# Patient Record
Sex: MALE | Race: WHITE
[De-identification: names, ages, dates, MRNs, and addresses within clinical notes are randomized per-mention and may not be internally consistent; named-entity substitution may affect disease eponyms.]

---

## 2017-02-07 PROBLEM — Z00.00 ENCOUNTER FOR PREVENTIVE HEALTH EXAMINATION: Status: ACTIVE | Noted: 2017-02-07

## 2017-02-13 ENCOUNTER — APPOINTMENT (OUTPATIENT)
Dept: HEMATOLOGY ONCOLOGY | Facility: CLINIC | Age: 62
End: 2017-02-13

## 2017-02-13 ENCOUNTER — OUTPATIENT (OUTPATIENT)
Dept: OUTPATIENT SERVICES | Facility: HOSPITAL | Age: 62
LOS: 1 days | Discharge: HOME | End: 2017-02-13

## 2017-02-13 ENCOUNTER — RESULT REVIEW (OUTPATIENT)
Age: 62
End: 2017-02-13

## 2017-02-13 VITALS
DIASTOLIC BLOOD PRESSURE: 96 MMHG | RESPIRATION RATE: 14 BRPM | SYSTOLIC BLOOD PRESSURE: 143 MMHG | BODY MASS INDEX: 31.84 KG/M2 | HEIGHT: 69 IN | WEIGHT: 215 LBS | HEART RATE: 75 BPM | TEMPERATURE: 98.6 F

## 2017-02-13 DIAGNOSIS — D69.6 THROMBOCYTOPENIA, UNSPECIFIED: ICD-10-CM

## 2017-02-13 DIAGNOSIS — Z86.79 PERSONAL HISTORY OF OTHER DISEASES OF THE CIRCULATORY SYSTEM: ICD-10-CM

## 2017-02-13 DIAGNOSIS — Z78.9 OTHER SPECIFIED HEALTH STATUS: ICD-10-CM

## 2017-02-13 LAB
BASOPHILS # BLD: 0.07 TH/MM3
BASOPHILS NFR BLD: 1 %
EOSINOPHIL # BLD: 0.18 TH/MM3
EOSINOPHIL NFR BLD: 2.7 %
ERYTHROCYTE [DISTWIDTH] IN BLOOD BY AUTOMATED COUNT: 13.9 %
GRANULOCYTES # BLD: 4.36 TH/MM3
GRANULOCYTES NFR BLD: 64.4 %
HCT VFR BLD AUTO: 45.3 %
HGB BLD-MCNC: 15.4 G/DL
IMM GRANULOCYTES # BLD: 0.01 TH/MM3
IMM GRANULOCYTES NFR BLD: 0.1 %
LYMPHOCYTES # BLD: 1.54 TH/MM3
LYMPHOCYTES NFR BLD: 22.7 %
MCH RBC QN AUTO: 27.9 PG
MCHC RBC AUTO-ENTMCNC: 34 G/DL
MCV RBC AUTO: 82.1 FL
MONOCYTES # BLD: 0.62 TH/MM3
MONOCYTES NFR BLD: 9.1 %
PLATELET # BLD: 136 TH/MM3
PMV BLD AUTO: 11.5 FL
RBC # BLD AUTO: 5.52 MIL/MM3
WBC # BLD: 6.78 TH/MM3

## 2017-02-14 PROBLEM — D69.6 THROMBOCYTOPENIA: Status: ACTIVE | Noted: 2017-02-13

## 2017-02-14 LAB
ALBUMIN SERPL-MCNC: 4.1 G/DL
ALBUMIN/GLOB SERPL: 1.86
ALP SERPL-CCNC: 60 IU/L
ALT SERPL-CCNC: 17 IU/L
ANION GAP SERPL CALC-SCNC: 9 MEQ/L
AST SERPL-CCNC: 18 IU/L
BILIRUB SERPL-MCNC: 1 MG/DL
BUN SERPL-MCNC: 24 MG/DL
BUN/CREAT SERPL: 16.2 %
CALCIUM SERPL-MCNC: 9.2 MG/DL
CHLORIDE SERPL-SCNC: 103 MEQ/L
CO2 SERPL-SCNC: 24 MEQ/L
CREAT SERPL-MCNC: 1.48 MG/DL
GFR SERPL CREATININE-BSD FRML MDRD: 49
GLUCOSE SERPL-MCNC: 60 MG/DL
PERI HIV-1 ANTIBODY SCREEN (NORTH): NONREACTIVE
POTASSIUM SERPL-SCNC: 3.9 MMOL/L
PROT SERPL-MCNC: 6.3 G/DL
SODIUM SERPL-SCNC: 136 MEQ/L

## 2017-02-15 LAB
LDH SERPL-CCNC: 165 U/L
LDH1 CFR SERPL ELPH: 24 %
LDH1/LDH2 SERPL-CRTO: 0.69
LDH2 CFR SERPL ELPH: 34 %
LDH3 CFR SERPL ELPH: 23 %
LDH4 CFR SERPL ELPH: 9 %
LDH5 CFR SERPL ELPH: 10 %

## 2017-02-16 LAB
ANA PAT FLD IF-IMP: ABNORMAL
ANA TITR SER: ABNORMAL
FOLATE SERPL-MCNC: 14.4 NG/ML
HBV CORE AB SER-ACNC: NONREACTIVE
HBV SURFACE AB SER-ACNC: NONREACTIVE
HBV SURFACE AG SER-ACNC: NONREACTIVE
HCV AB S/CO SERPL IA: 0.11 S/CO
HCV AB SER QL: NONREACTIVE
PS IGG SER-ACNC: <10 U/ML
PS IGM SER-ACNC: <25 U/ML
RHEUMATOID FACT SERPL-ACNC: 9 IU/ML
VIT B12 SERPL-MCNC: 585 PG/ML

## 2017-02-23 ENCOUNTER — RESULT REVIEW (OUTPATIENT)
Age: 62
End: 2017-02-23

## 2017-02-23 LAB
B2 GLYCOPROT1 IGA SER-ACNC: < 9 SAU
B2 GLYCOPROT1 IGG SER-ACNC: < 9 SGU
B2 GLYCOPROT1 IGM SER-ACNC: < 9 SMU
CARDIOLIPIN IGA SER IA-ACNC: < 11 APL

## 2017-03-09 ENCOUNTER — APPOINTMENT (OUTPATIENT)
Dept: HEMATOLOGY ONCOLOGY | Facility: CLINIC | Age: 62
End: 2017-03-09

## 2017-06-14 ENCOUNTER — EMERGENCY (EMERGENCY)
Facility: HOSPITAL | Age: 62
LOS: 0 days | Discharge: HOME | End: 2017-06-14

## 2017-06-28 DIAGNOSIS — M54.5 LOW BACK PAIN: ICD-10-CM

## 2017-06-28 DIAGNOSIS — M54.42 LUMBAGO WITH SCIATICA, LEFT SIDE: ICD-10-CM

## 2017-06-28 DIAGNOSIS — I10 ESSENTIAL (PRIMARY) HYPERTENSION: ICD-10-CM

## 2017-06-28 DIAGNOSIS — Z79.899 OTHER LONG TERM (CURRENT) DRUG THERAPY: ICD-10-CM

## 2017-11-03 ENCOUNTER — OUTPATIENT (OUTPATIENT)
Dept: OUTPATIENT SERVICES | Facility: HOSPITAL | Age: 62
LOS: 1 days | Discharge: HOME | End: 2017-11-03

## 2017-11-06 DIAGNOSIS — R89.7 ABNORMAL HISTOLOGICAL FINDINGS IN SPECIMENS FROM OTHER ORGANS, SYSTEMS AND TISSUES: ICD-10-CM

## 2018-01-17 ENCOUNTER — OUTPATIENT (OUTPATIENT)
Dept: OUTPATIENT SERVICES | Facility: HOSPITAL | Age: 63
LOS: 1 days | Discharge: HOME | End: 2018-01-17

## 2018-01-17 DIAGNOSIS — I10 ESSENTIAL (PRIMARY) HYPERTENSION: ICD-10-CM

## 2018-01-17 DIAGNOSIS — Z12.5 ENCOUNTER FOR SCREENING FOR MALIGNANT NEOPLASM OF PROSTATE: ICD-10-CM

## 2018-01-26 ENCOUNTER — OUTPATIENT (OUTPATIENT)
Dept: OUTPATIENT SERVICES | Facility: HOSPITAL | Age: 63
LOS: 1 days | Discharge: HOME | End: 2018-01-26

## 2018-01-26 DIAGNOSIS — E78.00 PURE HYPERCHOLESTEROLEMIA, UNSPECIFIED: ICD-10-CM

## 2018-07-06 ENCOUNTER — OUTPATIENT (OUTPATIENT)
Dept: OUTPATIENT SERVICES | Facility: HOSPITAL | Age: 63
LOS: 1 days | Discharge: HOME | End: 2018-07-06

## 2018-07-06 DIAGNOSIS — N40.0 BENIGN PROSTATIC HYPERPLASIA WITHOUT LOWER URINARY TRACT SYMPTOMS: ICD-10-CM

## 2018-07-27 PROBLEM — Z78.9 ALCOHOL USE: Status: ACTIVE | Noted: 2017-02-13

## 2018-12-07 ENCOUNTER — OUTPATIENT (OUTPATIENT)
Dept: OUTPATIENT SERVICES | Facility: HOSPITAL | Age: 63
LOS: 1 days | Discharge: HOME | End: 2018-12-07

## 2018-12-07 DIAGNOSIS — E78.5 HYPERLIPIDEMIA, UNSPECIFIED: ICD-10-CM

## 2018-12-07 DIAGNOSIS — I10 ESSENTIAL (PRIMARY) HYPERTENSION: ICD-10-CM

## 2018-12-27 ENCOUNTER — OUTPATIENT (OUTPATIENT)
Dept: OUTPATIENT SERVICES | Facility: HOSPITAL | Age: 63
LOS: 1 days | Discharge: HOME | End: 2018-12-27

## 2018-12-27 DIAGNOSIS — R35.1 NOCTURIA: ICD-10-CM

## 2018-12-27 DIAGNOSIS — N40.1 BENIGN PROSTATIC HYPERPLASIA WITH LOWER URINARY TRACT SYMPTOMS: ICD-10-CM

## 2019-05-03 ENCOUNTER — OUTPATIENT (OUTPATIENT)
Dept: OUTPATIENT SERVICES | Facility: HOSPITAL | Age: 64
LOS: 1 days | Discharge: HOME | End: 2019-05-03

## 2019-05-03 DIAGNOSIS — N40.0 BENIGN PROSTATIC HYPERPLASIA WITHOUT LOWER URINARY TRACT SYMPTOMS: ICD-10-CM

## 2019-07-01 ENCOUNTER — OUTPATIENT (OUTPATIENT)
Dept: OUTPATIENT SERVICES | Facility: HOSPITAL | Age: 64
LOS: 1 days | End: 2019-07-01
Payer: MEDICAID

## 2019-07-01 PROCEDURE — G9005: CPT

## 2019-07-23 ENCOUNTER — EMERGENCY (EMERGENCY)
Facility: HOSPITAL | Age: 64
LOS: 0 days | Discharge: HOME | End: 2019-07-23
Attending: EMERGENCY MEDICINE | Admitting: EMERGENCY MEDICINE
Payer: MEDICAID

## 2019-07-23 VITALS
TEMPERATURE: 99 F | RESPIRATION RATE: 18 BRPM | WEIGHT: 216.05 LBS | OXYGEN SATURATION: 100 % | HEART RATE: 79 BPM | HEIGHT: 69 IN | SYSTOLIC BLOOD PRESSURE: 154 MMHG | DIASTOLIC BLOOD PRESSURE: 89 MMHG

## 2019-07-23 VITALS
OXYGEN SATURATION: 97 % | HEART RATE: 62 BPM | RESPIRATION RATE: 18 BRPM | DIASTOLIC BLOOD PRESSURE: 79 MMHG | SYSTOLIC BLOOD PRESSURE: 136 MMHG | TEMPERATURE: 97 F

## 2019-07-23 DIAGNOSIS — K55.069 ACUTE INFARCTION OF INTESTINE, PART AND EXTENT UNSPECIFIED: ICD-10-CM

## 2019-07-23 DIAGNOSIS — R10.9 UNSPECIFIED ABDOMINAL PAIN: ICD-10-CM

## 2019-07-23 LAB
ALBUMIN SERPL ELPH-MCNC: 4.4 G/DL — SIGNIFICANT CHANGE UP (ref 3.5–5.2)
ALP SERPL-CCNC: 81 U/L — SIGNIFICANT CHANGE UP (ref 30–115)
ALT FLD-CCNC: 14 U/L — SIGNIFICANT CHANGE UP (ref 0–41)
ANION GAP SERPL CALC-SCNC: 10 MMOL/L — SIGNIFICANT CHANGE UP (ref 7–14)
APPEARANCE UR: CLEAR — SIGNIFICANT CHANGE UP
AST SERPL-CCNC: 14 U/L — SIGNIFICANT CHANGE UP (ref 0–41)
BASOPHILS # BLD AUTO: 0.08 K/UL — SIGNIFICANT CHANGE UP (ref 0–0.2)
BASOPHILS NFR BLD AUTO: 1.1 % — HIGH (ref 0–1)
BILIRUB SERPL-MCNC: 0.5 MG/DL — SIGNIFICANT CHANGE UP (ref 0.2–1.2)
BILIRUB UR-MCNC: NEGATIVE — SIGNIFICANT CHANGE UP
BUN SERPL-MCNC: 20 MG/DL — SIGNIFICANT CHANGE UP (ref 10–20)
CALCIUM SERPL-MCNC: 9 MG/DL — SIGNIFICANT CHANGE UP (ref 8.5–10.1)
CHLORIDE SERPL-SCNC: 104 MMOL/L — SIGNIFICANT CHANGE UP (ref 98–110)
CO2 SERPL-SCNC: 27 MMOL/L — SIGNIFICANT CHANGE UP (ref 17–32)
COLOR SPEC: YELLOW — SIGNIFICANT CHANGE UP
CREAT SERPL-MCNC: 1.3 MG/DL — SIGNIFICANT CHANGE UP (ref 0.7–1.5)
DIFF PNL FLD: NEGATIVE — SIGNIFICANT CHANGE UP
EOSINOPHIL # BLD AUTO: 0.12 K/UL — SIGNIFICANT CHANGE UP (ref 0–0.7)
EOSINOPHIL NFR BLD AUTO: 1.6 % — SIGNIFICANT CHANGE UP (ref 0–8)
GLUCOSE SERPL-MCNC: 97 MG/DL — SIGNIFICANT CHANGE UP (ref 70–99)
GLUCOSE UR QL: NEGATIVE MG/DL — SIGNIFICANT CHANGE UP
HCT VFR BLD CALC: 47 % — SIGNIFICANT CHANGE UP (ref 42–52)
HGB BLD-MCNC: 15.6 G/DL — SIGNIFICANT CHANGE UP (ref 14–18)
IMM GRANULOCYTES NFR BLD AUTO: 0.5 % — HIGH (ref 0.1–0.3)
KETONES UR-MCNC: NEGATIVE — SIGNIFICANT CHANGE UP
LACTATE SERPL-SCNC: 0.8 MMOL/L — SIGNIFICANT CHANGE UP (ref 0.5–2.2)
LEUKOCYTE ESTERASE UR-ACNC: NEGATIVE — SIGNIFICANT CHANGE UP
LIDOCAIN IGE QN: 34 U/L — SIGNIFICANT CHANGE UP (ref 7–60)
LYMPHOCYTES # BLD AUTO: 1.39 K/UL — SIGNIFICANT CHANGE UP (ref 1.2–3.4)
LYMPHOCYTES # BLD AUTO: 18.7 % — LOW (ref 20.5–51.1)
MCHC RBC-ENTMCNC: 28 PG — SIGNIFICANT CHANGE UP (ref 27–31)
MCHC RBC-ENTMCNC: 33.2 G/DL — SIGNIFICANT CHANGE UP (ref 32–37)
MCV RBC AUTO: 84.4 FL — SIGNIFICANT CHANGE UP (ref 80–94)
MONOCYTES # BLD AUTO: 0.69 K/UL — HIGH (ref 0.1–0.6)
MONOCYTES NFR BLD AUTO: 9.3 % — SIGNIFICANT CHANGE UP (ref 1.7–9.3)
NEUTROPHILS # BLD AUTO: 5.12 K/UL — SIGNIFICANT CHANGE UP (ref 1.4–6.5)
NEUTROPHILS NFR BLD AUTO: 68.8 % — SIGNIFICANT CHANGE UP (ref 42.2–75.2)
NITRITE UR-MCNC: NEGATIVE — SIGNIFICANT CHANGE UP
NRBC # BLD: 0 /100 WBCS — SIGNIFICANT CHANGE UP (ref 0–0)
PH UR: 6 — SIGNIFICANT CHANGE UP (ref 5–8)
PLATELET # BLD AUTO: 110 K/UL — LOW (ref 130–400)
POTASSIUM SERPL-MCNC: 4.1 MMOL/L — SIGNIFICANT CHANGE UP (ref 3.5–5)
POTASSIUM SERPL-SCNC: 4.1 MMOL/L — SIGNIFICANT CHANGE UP (ref 3.5–5)
PROT SERPL-MCNC: 6.6 G/DL — SIGNIFICANT CHANGE UP (ref 6–8)
PROT UR-MCNC: 30 MG/DL
RBC # BLD: 5.57 M/UL — SIGNIFICANT CHANGE UP (ref 4.7–6.1)
RBC # FLD: 14 % — SIGNIFICANT CHANGE UP (ref 11.5–14.5)
RBC CASTS # UR COMP ASSIST: ABNORMAL /HPF
SODIUM SERPL-SCNC: 141 MMOL/L — SIGNIFICANT CHANGE UP (ref 135–146)
SP GR SPEC: 1.02 — SIGNIFICANT CHANGE UP (ref 1.01–1.03)
UROBILINOGEN FLD QL: 0.2 MG/DL — SIGNIFICANT CHANGE UP (ref 0.2–0.2)
WBC # BLD: 7.44 K/UL — SIGNIFICANT CHANGE UP (ref 4.8–10.8)
WBC # FLD AUTO: 7.44 K/UL — SIGNIFICANT CHANGE UP (ref 4.8–10.8)
WBC UR QL: ABNORMAL /HPF

## 2019-07-23 PROCEDURE — 99284 EMERGENCY DEPT VISIT MOD MDM: CPT

## 2019-07-23 PROCEDURE — 74018 RADEX ABDOMEN 1 VIEW: CPT | Mod: 26

## 2019-07-23 PROCEDURE — 74177 CT ABD & PELVIS W/CONTRAST: CPT | Mod: 26

## 2019-07-23 PROCEDURE — 76705 ECHO EXAM OF ABDOMEN: CPT | Mod: 26

## 2019-07-23 RX ORDER — SODIUM CHLORIDE 9 MG/ML
1000 INJECTION, SOLUTION INTRAVENOUS ONCE
Refills: 0 | Status: DISCONTINUED | OUTPATIENT
Start: 2019-07-23 | End: 2019-07-23

## 2019-07-23 RX ORDER — KETOROLAC TROMETHAMINE 30 MG/ML
15 SYRINGE (ML) INJECTION ONCE
Refills: 0 | Status: DISCONTINUED | OUTPATIENT
Start: 2019-07-23 | End: 2019-07-23

## 2019-07-23 RX ORDER — IOHEXOL 300 MG/ML
30 INJECTION, SOLUTION INTRAVENOUS ONCE
Refills: 0 | Status: COMPLETED | OUTPATIENT
Start: 2019-07-23 | End: 2019-07-23

## 2019-07-23 RX ADMIN — IOHEXOL 30 MILLILITER(S): 300 INJECTION, SOLUTION INTRAVENOUS at 11:40

## 2019-07-23 RX ADMIN — Medication 15 MILLIGRAM(S): at 14:30

## 2019-07-23 NOTE — ED PROVIDER NOTE - PHYSICAL EXAMINATION
CONSTITUTIONAL: Well-developed; well-nourished; in no acute distress.   SKIN: warm, dry  HEAD: Normocephalic; atraumatic.  EYES: no conjunctival injection.   ENT: No nasal discharge; airway clear.  CARD: Regular rate and rhythm.   RESP: CTA  ABD: Soft, non distended. Tender to palpation in RUQ and RLQ. No skin lesions/changes overlying abdomen. [place] negative. [place] negative. Obturator negative.   EXT: Normal ROM.  No clubbing, cyanosis or edema.   LYMPH: No acute cervical adenopathy.  NEURO: Alert, oriented, grossly unremarkable  PSYCH: Cooperative, appropriate.

## 2019-07-23 NOTE — ED PROVIDER NOTE - CARE PROVIDERS DIRECT ADDRESSES
[email or web address]

## 2019-07-23 NOTE — ED PROVIDER NOTE - CARE PROVIDER_API CALL
Abbi Low)  Surgery  [address], 3rd [place] [address]  Phone: [phone]  Fax: [phone]  Follow Up Time:     Fransico Garcias)  Urology  [address], [address]  [place] [address]  Phone: [phone]  Fax: [phone]  Follow Up Time:

## 2019-07-23 NOTE — CONSULT NOTE ADULT - ASSESSMENT
Impression     A 64 male with RLQ pain x 2 days and umbilical hernia x few years. wbc unremarkable    Plan    -awaiting CT scan image   -case to be discussed with the attending Impression     A 64 male with RLQ pain x 2 days and umbilical hernia x few years. wbc 7.44 , afebrile , lact 0.8 , minimal tenderness in RLQ.   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  Complex left upper pole cystic renal mass with thickened septations and probable solid component, is suspicious for a malignant cystic neoplasm, a Bosniak IV lesion.  Incompletely characterized 1.2 cm right upper pole renal mass.   Nonobstructing 5 mm left lower pole renal calculus.      Plan  -no acute surgical intervention   -pt need to follow up as outpatient in 1 x week with general surgery Dr. Pond   -pt need to follow up as outpatient with his urologist for which already has established care for elevated PSA and in the light of new CT scan findings of renal masses bilaterally   -instruct pt to return to ED if fever, pain worsen, nausea, vomiting , fever  -case discussed with the attending Dr. Pond

## 2019-07-23 NOTE — ED PROVIDER NOTE - ATTENDING CONTRIBUTION TO CARE
64 M to ED with RUQ since yesterday morning, pt ignored it for a day but not improving so to ED. no N/V/D and well appearing non toxic. pain located to RUQ over liveredge. Denies any recent travel or bad food intake, no sick contacts. No h/o hepatitis or other complaints.  AVSS, exam as noted, CTAB, RRR, abdomen tender RUQ (+) bowel sounds, neuro nonfocal

## 2019-07-23 NOTE — CONSULT NOTE ADULT - SUBJECTIVE AND OBJECTIVE BOX
cc: RLQ abdominal pain and umbilical hernia  HPI:  A 64 male with pmhx of HTN presents with RLQ abdominal pain x 2 days, general surgery was consulted for RLQ abdominal pain and umbilical hernia.  Pt reports pains started yesterday woke him up, 6/10, constant in RLQ non radiating. Pt reports pain increases with movement. Pt denies similar pain in the past. Pt denies heavy lifting recently but reports 3 days ago ,was at the pool and outstretched to reach his phone. Pt reports has been having umbilical hernia for few year never bothered him, has seen surgeon in the past , was recommended to loose weight. pt denies pain at the umbilical area. Pt having regular bowel movements last BM today. PT denies fever, chills, chest pain, shortness of breath, burning with urination, nausea, vomiting.    Pt had colonoscopy 2 years ago no abnormal findings .        PAST MEDICAL & SURGICAL HISTORY:  Hypertension  Left shoulder surgery      Home meds - cozaar     Allergies    No Known Allergies    Intolerances    SOCIAL HISTORY: - denies smoking drinking or use of illicit drugs.     FAMILY HISTORY: sister kidney stones     Physical Exam:  General: NAD, resting comfortably  HEENT: NC/AT, EOMI, normal hearing, no oral lesions, no LAD, neck supple  Pulmonary: normal resp effort, CTA-B  Cardiovascular: NSR, no murmurs  Abdominal: obese, palpable umbilical hernia non tender, reducible no skin changes, RLQ tenderness, non distended, no organomegaly  Extremities: WWP, normal strength, no clubbing/cyanosis/edema      Vital Signs Last 24 Hrs  T(C): 37.2 (2019 08:57), Max: 37.2 (2019 08:57)  T(F): 98.9 (2019 08:57), Max: 98.9 (2019 08:57)  HR: 79 (2019 08:57) (79 - 79)  BP: 154/89 (2019 08:57) (154/89 - 154/89)  BP(mean): --  RR: 18 (2019 08:57) (18 - 18)  SpO2: 100% (2019 08:57) (100% - 100%)        LABS:                        15.6   7.44  )-----------( 110      ( 2019 09:50 )             47.0     07-23    141  |  104  |  20  ----------------------------<  97  4.1   |  27  |  1.3    Ca    9.0      2019 09:50    TPro  6.6  /  Alb  4.4  /  TBili  0.5  /  DBili  x   /  AST  14  /  ALT  14  /  AlkPhos  81        Urinalysis Basic - ( 2019 09:50 )    Color: Yellow / Appearance: Clear / S.020 / pH: x  Gluc: x / Ketone: Negative  / Bili: Negative / Urobili: 0.2 mg/dL   Blood: x / Protein: 30 mg/dL / Nitrite: Negative   Leuk Esterase: Negative / RBC: 3-5 /HPF / WBC 6-10 /HPF   Sq Epi: x / Non Sq Epi: x / Bacteria: x      CAPILLARY BLOOD GLUCOSE    LIVER FUNCTIONS - ( 2019 09:50 )  Alb: 4.4 g/dL / Pro: 6.6 g/dL / ALK PHOS: 81 U/L / ALT: 14 U/L / AST: 14 U/L / GGT: x             Radiology     CT - pending   < from: US Abdomen Limited (19 @ 10:01) >  GALLBLADDER/BILIARY TREE: There are no gallstones. There is no   gallbladder wall thickening. There is no pericholecystic fluid. Common   duct measures approximately 4 mm    PANCREAS: Pancreas is normal.    KIDNEY:  Right kidney measures 12.6 cm in length. No hydronephrosis,   calculi or solid mass.    AORTA/IVC:  Visualized proximal portions unremarkable.    ASCITES:  None.    IMPRESSION:    Unremarkable right upper quadrant ultrasound.    Thank you for the courtesy of this consult.    Sincerely,    Zackery Qureshi MD    < end of copied text > cc: RLQ abdominal pain and umbilical hernia  HPI:  A 64 male with pmhx of HTN presents with RLQ abdominal pain x 2 days, general surgery was consulted for RLQ abdominal pain and umbilical hernia.  Pt reports pains started yesterday woke him up, 6/10, constant in RLQ non radiating. Pt reports pain increases with movement. Pt denies similar pain in the past. Pt denies heavy lifting recently but reports 3 days ago ,was at the pool and outstretched to reach his phone. Pt reports has been having umbilical hernia for few year never bothered him, has seen surgeon in the past , was recommended to loose weight. pt denies pain at the umbilical area. Pt having regular bowel movements last BM today. PT denies fever, chills, chest pain, shortness of breath, burning with urination, nausea, vomiting.    Pt had colonoscopy 2 years ago no abnormal findings .        PAST MEDICAL & SURGICAL HISTORY:  Hypertension  Left shoulder surgery      Home meds - cozaar     Allergies    No Known Allergies    Intolerances    SOCIAL HISTORY: - denies smoking drinking or use of illicit drugs.     FAMILY HISTORY: sister kidney stones     Physical Exam:  General: NAD, resting comfortably  HEENT: NC/AT, EOMI, normal hearing, no oral lesions, no LAD, neck supple  Pulmonary: normal resp effort, CTA-B  Cardiovascular: NSR, no murmurs  Abdominal: obese, palpable umbilical hernia non tender, reducible no skin changes, RLQ tenderness, non distended, no organomegaly  Extremities: WWP, normal strength, no clubbing/cyanosis/edema      Vital Signs Last 24 Hrs  T(C): 37.2 (2019 08:57), Max: 37.2 (2019 08:57)  T(F): 98.9 (2019 08:57), Max: 98.9 (2019 08:57)  HR: 79 (2019 08:57) (79 - 79)  BP: 154/89 (2019 08:57) (154/89 - 154/89)  BP(mean): --  RR: 18 (2019 08:57) (18 - 18)  SpO2: 100% (2019 08:57) (100% - 100%)        LABS:                        15.6   7.44  )-----------( 110      ( 2019 09:50 )             47.0     07-23    141  |  104  |  20  ----------------------------<  97  4.1   |  27  |  1.3    Ca    9.0      2019 09:50    TPro  6.6  /  Alb  4.4  /  TBili  0.5  /  DBili  x   /  AST  14  /  ALT  14  /  AlkPhos  81        Urinalysis Basic - ( 2019 09:50 )    Color: Yellow / Appearance: Clear / S.020 / pH: x  Gluc: x / Ketone: Negative  / Bili: Negative / Urobili: 0.2 mg/dL   Blood: x / Protein: 30 mg/dL / Nitrite: Negative   Leuk Esterase: Negative / RBC: 3-5 /HPF / WBC 6-10 /HPF   Sq Epi: x / Non Sq Epi: x / Bacteria: x      CAPILLARY BLOOD GLUCOSE    LIVER FUNCTIONS - ( 2019 09:50 )  Alb: 4.4 g/dL / Pro: 6.6 g/dL / ALK PHOS: 81 U/L / ALT: 14 U/L / AST: 14 U/L / GGT: x             Radiology       < from: US Abdomen Limited (19 @ 10:01) >  GALLBLADDER/BILIARY TREE: There are no gallstones. There is no   gallbladder wall thickening. There is no pericholecystic fluid. Common   duct measures approximately 4 mm    PANCREAS: Pancreas is normal.    KIDNEY:  Right kidney measures 12.6 cm in length. No hydronephrosis,   calculi or solid mass.    AORTA/IVC:  Visualized proximal portions unremarkable.    ASCITES:  None.    IMPRESSION:    Unremarkable right upper quadrant ultrasound.    Thank you for the courtesy of this consult.    Sincerely,    Zackery Qureshi MD    < end of copied text >    < from: CT Abdomen and Pelvis w/ Oral Cont and w/ IV Cont (19 @ 13:49) >  PERITONEUM/MESENTERY/BOWEL: A 4.4 x 2.6 cm ovoid fat density structure   with a hyperdense rim and surrounding fat stranding noted adjacent to the   proximal ascending colon (series 2 image 35). No bowel obstruction,   ascites or pneumoperitoneum. Normal appendix. No intra-abdominal fluid   collections.  BONES/SOFT TISSUES: No acute osseous abnormalities. Small fat-containing   umbilical and bilateral inguinal hernias.  OTHER: Scattered atherosclerotic vascular calcifications.  IMPRESSION:  1.  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  2.  Complex left upper pole cystic renal mass with thickened septations   and probable solid component, is suspicious for a malignant cystic   neoplasm, a Bosniak IV lesion. Nonemergent urologic consultation is   recommended.   3.  Incompletely characterized 1.2 cm right upper pole renal mass. A   renal protocol CT or MRI is suggested for further assessment.  4.  Nonobstructing 5 mm left lower pole renal calculus.  Dr. Lilia Collazo Spoke with HAYDEN SEPULVEDA on 2019 2:00 PM with readback.  < end of copied text >

## 2019-07-23 NOTE — ED PROVIDER NOTE - PROGRESS NOTE DETAILS
Pt reassessed and informed of results of blood tests and imaging. Continues to have pain in RUQ and RLQ. Surgery informed of irreducible umbilical hernia. Will order CT and lactate to further evaluate.

## 2019-07-23 NOTE — ED PROVIDER NOTE - NS ED ROS FT
Eyes:  No visual changes  ENMT:  No sore throat  Cardiac:  No chest pain, SOB or edema.   Respiratory:  No cough or difficulty breathing   GI:  No nausea, vomiting, diarrhea +abdominal pain   :  No dysuria, frequency or burning.  MS:  No back pain   Neuro:  No headache   Skin:  No skin rash.   Endocrine: No history of diabetes.

## 2019-07-23 NOTE — ED ADULT NURSE NOTE - NSIMPLEMENTINTERV_GEN_ALL_ED
Implemented All Universal Safety Interventions:  [place] to call system. Call bell, personal items and telephone within reach. Instruct patient to call for assistance. Room bathroom lighting operational. Non-slip footwear when patient is off stretcher. Physically safe environment: no spills, clutter or unnecessary equipment. Stretcher in lowest position, wheels locked, appropriate side rails in place.

## 2019-07-23 NOTE — ED PROVIDER NOTE - OBJECTIVE STATEMENT
64y m HTN presenting for RLQ pain x24 hours. Pt says he woke up in the morning yesterday and noted crampy pain in RLQ. Coughing and moving makes pain worse. No relieving factors. No radiation. Has never experienced pain like this before. No bulges. No trauma/falls. No f/c/n/v. Normal appetite. Bowel movements are normal consistency and frequency, but paler since yesterday than usual. No dysuria, burning with urination, incontinence, or hematuria. 64y m HTN presenting for RUQ pain x24 hours. Pt says he woke up in the morning yesterday and noted crampy pain in RUQ. Coughing and moving makes pain worse. No relieving factors. No radiation. Has never experienced pain like this before. No bulges. No trauma/falls. No f/c/n/v. Normal appetite. Bowel movements are normal consistency and frequency, but paler since yesterday than usual. No dysuria, burning with urination, incontinence, or hematuria.

## 2019-07-24 DIAGNOSIS — Z71.89 OTHER SPECIFIED COUNSELING: ICD-10-CM

## 2019-07-29 DIAGNOSIS — Z76.89 PERSONS ENCOUNTERING HEALTH SERVICES IN OTHER SPECIFIED CIRCUMSTANCES: ICD-10-CM

## 2019-09-01 ENCOUNTER — OUTPATIENT (OUTPATIENT)
Dept: OUTPATIENT SERVICES | Facility: HOSPITAL | Age: 64
LOS: 1 days | End: 2019-09-01

## 2019-09-06 ENCOUNTER — OUTPATIENT (OUTPATIENT)
Dept: OUTPATIENT SERVICES | Facility: HOSPITAL | Age: 64
LOS: 1 days | Discharge: HOME | End: 2019-09-06

## 2019-09-06 DIAGNOSIS — Z01.818 ENCOUNTER FOR OTHER PREPROCEDURAL EXAMINATION: ICD-10-CM

## 2019-09-06 PROBLEM — I10 ESSENTIAL (PRIMARY) HYPERTENSION: Chronic | Status: ACTIVE | Noted: 2019-07-23

## 2019-10-08 DIAGNOSIS — Z71.89 OTHER SPECIFIED COUNSELING: ICD-10-CM

## 2020-01-03 ENCOUNTER — EMERGENCY (EMERGENCY)
Facility: HOSPITAL | Age: 65
LOS: 0 days | Discharge: HOME | End: 2020-01-03
Attending: EMERGENCY MEDICINE | Admitting: EMERGENCY MEDICINE
Payer: MEDICAID

## 2020-01-03 VITALS
TEMPERATURE: 98 F | DIASTOLIC BLOOD PRESSURE: 79 MMHG | WEIGHT: 214.07 LBS | OXYGEN SATURATION: 98 % | HEART RATE: 82 BPM | SYSTOLIC BLOOD PRESSURE: 117 MMHG | RESPIRATION RATE: 19 BRPM

## 2020-01-03 DIAGNOSIS — M54.2 CERVICALGIA: ICD-10-CM
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
## 2020-01-03 DIAGNOSIS — S43.101A UNSPECIFIED DISLOCATION OF RIGHT ACROMIOCLAVICULAR JOINT, INITIAL ENCOUNTER: ICD-10-CM

## 2020-01-03 DIAGNOSIS — S09.90XA UNSPECIFIED INJURY OF HEAD, INITIAL ENCOUNTER: ICD-10-CM

## 2020-01-03 PROCEDURE — 99284 EMERGENCY DEPT VISIT MOD MDM: CPT

## 2020-01-03 PROCEDURE — 73030 X-RAY EXAM OF SHOULDER: CPT | Mod: 26,RT

## 2020-01-03 PROCEDURE — 72125 CT NECK SPINE W/O DYE: CPT | Mod: 26

## 2020-01-03 PROCEDURE — 70450 CT HEAD/BRAIN W/O DYE: CPT | Mod: 26

## 2020-01-03 NOTE — ED ADULT TRIAGE NOTE - CHIEF COMPLAINT QUOTE
fell off a ladder at 3 pm. Pt states it is a 4 foot ladder. Pt was at the top of the ladder and landed on  his right shoulder . Pt did hit his head. Denies LOC . Pt c/o neck pain and shoulder pain

## 2020-01-03 NOTE — ED PROVIDER NOTE - NS ED ROS FT
Constitutional: (-) fever  Eyes/ENT: (-) blurry vision, (-) epistaxis  Cardiovascular: (-) chest pain, (-) syncope  Respiratory: (-) cough, (-) shortness of breath  Gastrointestinal: (-) vomiting, (-) diarrhea  Musculoskeletal: (+) neck pain, (-) back pain, (+) joint pain  Integumentary: (-) rash, (-) edema  Neurological: (+) headache, (-) altered mental status  Psychiatric: (-) hallucinations  Allergic/Immunologic: (-) pruritus

## 2020-01-03 NOTE — ED ADULT NURSE NOTE - NSIMPLEMENTINTERV_GEN_ALL_ED
Implemented All Universal Safety Interventions:  [place] to call system. Call bell, personal items and telephone within reach. Instruct patient to call for assistance. Room bathroom lighting operational. Non-slip footwear when patient is off stretcher. Physically safe environment: no spills, clutter or unnecessary equipment. Stretcher in lowest position, wheels locked, appropriate side rails in place.

## 2020-01-03 NOTE — ED PROVIDER NOTE - NSFOLLOWUPINSTRUCTIONS_ED_ALL_ED_FT
Follow up with your primary care doctor and your orthopedic in 1-2 days    Head Injury    WHAT YOU NEED TO KNOW:    A head injury is most often caused by a blow to the head. This may occur from a fall, bicycle injury, sports injury, being struck in the head, or a motor vehicle accident.     DISCHARGE INSTRUCTIONS:    Call 911 or have someone else call for any of the following:     You cannot be woken.      You have a seizure.      You stop responding to others or you faint.      You have blurry or double vision.      Your speech becomes slurred or confused.      You have arm or leg weakness, loss of feeling, or new problems with coordination.      Your pupils are larger than usual or one pupil is a different size than the other.       You have blood or clear fluid coming out of your ears or nose.    Return to the emergency department if:     You have repeated or forceful vomiting.      You feel confused.      Your headache gets worse or becomes severe.      You or someone caring for you notices that you are harder to wake than usual.    Contact your healthcare provider if:     Your symptoms last longer than 6 weeks after the injury.      You have questions or concerns about your condition or care.    Medicines:     Acetaminophen decreases pain. Acetaminophen is available without a doctor's order. Ask how much to take and how often to take it. Follow directions. Acetaminophen can cause liver damage if not taken correctly.      Take your medicine as directed. Contact your healthcare provider if you think your medicine is not helping or if you have side effects. Tell him or her if you are allergic to any medicine. Keep a list of the medicines, vitamins, and herbs you take. Include the amounts, and when and why you take them. Bring the list or the pill bottles to follow-up visits. Carry your medicine list with you in case of an emergency.    Self-care:     Rest or do quiet activities for 24 to 48 hours. Limit your time watching TV, using the computer, or doing tasks that require a lot of thinking. Slowly return to your normal activities as directed. Do not play sports or do activities that may cause you to get hit in the head. Ask your healthcare provider when you can return to sports.       Apply ice on your head for 15 to 20 minutes every hour or as directed. Use an ice pack, or put crushed ice in a plastic bag. Cover it with a towel before you apply it to your skin. Ice helps prevent tissue damage and decreases swelling and pain.       Have someone stay with you for 24 hours or as directed. This person can monitor you for complications and call 911. When you are awake the person should ask you a few questions to see if you are thinking clearly. An example would be to ask your name or your address.     Prevent another head injury:     Wear a helmet that fits properly. Do this when you play sports, or ride a bike, scooter, or skateboard. Helmets help decrease your risk of a serious head injury. Talk to your healthcare provider about other ways you can protect yourself if you play sports.      Wear your seat belt every time you are in a car. This helps to decrease your risk for a head injury if you are in a car accident.     Follow up with your healthcare provider as directed: Write down your questions so you remember to ask them during your visits.        © Copyright [employer] 2019 All illustrations and images included in CareNotes are the copyrighted property of [employer] or [employer].        Acromioclavicular Separation    WHAT YOU NEED TO KNOW:    An acromioclavicular separation (AS), or shoulder separation, is when your shoulder and collarbone move or come apart. An AS is usually caused by an injury, such as falling on your shoulder. The bones move or come apart because the ligaments that hold the bones in place are stretched or torn.    DISCHARGE INSTRUCTIONS:    Medicines: You may need any of the following:     Acetaminophen decreases pain and is available without a doctor's order. Ask how much to take and how often to take it. Follow directions. Acetaminophen can cause liver damage if not taken correctly.      NSAIDs help decrease swelling and pain. This medicine is available with or without a doctor's order. NSAIDs can cause stomach bleeding or kidney problems in certain people. If you take blood thinner medicine, always ask your healthcare provider if NSAIDs are safe for you. Always read the medicine label and follow directions.      A Tetanus (Td) vaccine may be needed if you have an open wound. This vaccine is a booster shot used to help prevent diphtheria and tetanus.      Take your medicine as directed. Contact your healthcare provider if you think your medicine is not helping or if you have side effects. Tell him if you are allergic to any medicine. Keep a list of the medicines, vitamins, and herbs you take. Include the amounts, and when and why you take them. Bring the list or the pill bottles to follow-up visits. Carry your medicine list with you in case of an emergency.    Apply ice: Apply ice on your shoulder for 15 to 20 minutes every hour for the first 1 to 2 days. Use an ice pack, or put crushed ice in a plastic bag. Cover it with a towel. Ice helps prevent tissue damage and decreases swelling and pain.    Apply heat: Apply heat on your shoulder for 20 to 30 minutes every 2 hours after the first 1 to 2 days. Heat helps decrease pain and muscle spasms.    Wear your support device: You may need to wear a strap, elastic bandage, or sling. These devices keep your shoulder in the correct position so it can heal.     Wear the strap or sling constantly for 6 to 8 weeks, even when you sleep. You may remove the strap or sling when you bathe. Do not move your shoulder or arm when the strap or sling is off. Do not lift your arm.      The strap or sling must be tightened by another person every day. Tighten it enough to keep your shoulders back in the correct posture. Tell the person to allow enough room to fit an index finger between your body and the strap. Put a folded wash cloth in your armpit to prevent pressure on the nerves by the strap. Loosen the strap if you feel numbness or tingling in your arm or hand.    Rest your shoulder: Rest your shoulder as much as possible to decrease swelling and help it heal.     Follow up with your healthcare provider as directed: Write down your questions so you remember to ask them during your visits.     Contact your healthcare provider if:     You have a fever.      You have worse pain, even after you take medicine.      You have an open wound that is red, swollen, or draining pus.      Your arm or hand becomes numb or tingles.      You have questions or concerns about your condition or care.    Return to the emergency department if:     You lose feeling in your arm or hand.      You cannot move your arm or hand.         © Copyright [employer] 2019 All illustrations and images included in CareNotes are the copyrighted property of [employer] or [employer].

## 2020-01-03 NOTE — ED PROVIDER NOTE - PHYSICAL EXAMINATION
Physical Exam    Vital Signs: I have reviewed the initial vital signs.  Constitutional: well-nourished, appears stated age, no acute distress  Eyes: Conjunctiva pink, Sclera clear, PERRLA, EOMI.  Cardiovascular: S1 and S2, regular rate, regular rhythm, well-perfused extremities, radial pulses equal and 2+  Respiratory: unlabored respiratory effort, clear to auscultation bilaterally no wheezing, rales and rhonchi  Gastrointestinal: soft, non-tender abdomen, no pulsatile mass, normal bowl sounds  Musculoskeletal: supple neck, no lower extremity edema, no midline tenderness, + right sided trapizous and para cerivical tenderenss noted. + right AC joint tenderness with mild tenting noted. FROM of right shoulder. axillary nerve intact.   Integumentary: warm, dry, no rash  Neurologic: awake, alert, cranial nerves II-XII grossly intact, extremities’ motor and sensory functions grossly intact  Psychiatric: appropriate mood, appropriate affect

## 2020-01-03 NOTE — ED PROVIDER NOTE - ATTENDING CONTRIBUTION TO CARE
s/p fall.  c/o neck pain and shoulder pain.  VS noted.  ttp right AC joint with some laxity.  xray confirms AC sep.  mild cervical midline ttp.  await CT.

## 2020-01-03 NOTE — ED PROVIDER NOTE - PATIENT PORTAL LINK FT
You can access the FollowMyHealth Patient Portal offered by [facility] by registering at the following website: [email or web address]. By joining [employer]’s FollowMyHealth portal, you will also be able to view your health information using other applications (apps) compatible with our system.

## 2021-04-19 ENCOUNTER — APPOINTMENT (OUTPATIENT)
Dept: GASTROENTEROLOGY | Facility: CLINIC | Age: 66
End: 2021-04-19

## 2022-02-24 ENCOUNTER — APPOINTMENT (OUTPATIENT)
Age: 67
End: 2022-02-24

## 2023-01-30 ENCOUNTER — APPOINTMENT (OUTPATIENT)
Dept: NEUROLOGY | Facility: CLINIC | Age: 68
End: 2023-01-30

## 2023-02-13 ENCOUNTER — EMERGENCY (EMERGENCY)
Facility: HOSPITAL | Age: 68
LOS: 0 days | Discharge: HOME | End: 2023-02-14
Attending: EMERGENCY MEDICINE
Payer: MEDICARE

## 2023-02-13 VITALS
OXYGEN SATURATION: 94 % | SYSTOLIC BLOOD PRESSURE: 163 MMHG | TEMPERATURE: 100 F | RESPIRATION RATE: 17 BRPM | HEART RATE: 94 BPM | DIASTOLIC BLOOD PRESSURE: 83 MMHG

## 2023-02-13 VITALS
TEMPERATURE: 100 F | OXYGEN SATURATION: 96 % | DIASTOLIC BLOOD PRESSURE: 100 MMHG | HEART RATE: 110 BPM | SYSTOLIC BLOOD PRESSURE: 172 MMHG | RESPIRATION RATE: 18 BRPM

## 2023-02-13 DIAGNOSIS — R05.1 ACUTE COUGH: ICD-10-CM

## 2023-02-13 DIAGNOSIS — R94.4 ABNORMAL RESULTS OF KIDNEY FUNCTION STUDIES: ICD-10-CM

## 2023-02-13 DIAGNOSIS — R07.9 CHEST PAIN, UNSPECIFIED: ICD-10-CM

## 2023-02-13 DIAGNOSIS — I10 ESSENTIAL (PRIMARY) HYPERTENSION: ICD-10-CM

## 2023-02-13 DIAGNOSIS — Z20.822 CONTACT WITH AND (SUSPECTED) EXPOSURE TO COVID-19: ICD-10-CM

## 2023-02-13 DIAGNOSIS — J18.9 PNEUMONIA, UNSPECIFIED ORGANISM: ICD-10-CM

## 2023-02-13 LAB
ALBUMIN SERPL ELPH-MCNC: 4.1 G/DL — SIGNIFICANT CHANGE UP (ref 3.5–5.2)
ALBUMIN SERPL ELPH-MCNC: 4.2 G/DL — SIGNIFICANT CHANGE UP (ref 3.5–5.2)
ALP SERPL-CCNC: 78 U/L — SIGNIFICANT CHANGE UP (ref 30–115)
ALP SERPL-CCNC: 79 U/L — SIGNIFICANT CHANGE UP (ref 30–115)
ALT FLD-CCNC: 13 U/L — SIGNIFICANT CHANGE UP (ref 0–41)
ALT FLD-CCNC: 14 U/L — SIGNIFICANT CHANGE UP (ref 0–41)
ANION GAP SERPL CALC-SCNC: 11 MMOL/L — SIGNIFICANT CHANGE UP (ref 7–14)
ANION GAP SERPL CALC-SCNC: 11 MMOL/L — SIGNIFICANT CHANGE UP (ref 7–14)
AST SERPL-CCNC: 19 U/L — SIGNIFICANT CHANGE UP (ref 0–41)
AST SERPL-CCNC: 20 U/L — SIGNIFICANT CHANGE UP (ref 0–41)
BASOPHILS # BLD AUTO: 0.03 K/UL — SIGNIFICANT CHANGE UP (ref 0–0.2)
BASOPHILS NFR BLD AUTO: 0.6 % — SIGNIFICANT CHANGE UP (ref 0–1)
BILIRUB SERPL-MCNC: 0.6 MG/DL — SIGNIFICANT CHANGE UP (ref 0.2–1.2)
BILIRUB SERPL-MCNC: 0.7 MG/DL — SIGNIFICANT CHANGE UP (ref 0.2–1.2)
BUN SERPL-MCNC: 24 MG/DL — HIGH (ref 10–20)
BUN SERPL-MCNC: 24 MG/DL — HIGH (ref 10–20)
CALCIUM SERPL-MCNC: 9.3 MG/DL — SIGNIFICANT CHANGE UP (ref 8.4–10.5)
CALCIUM SERPL-MCNC: 9.5 MG/DL — SIGNIFICANT CHANGE UP (ref 8.4–10.5)
CHLORIDE SERPL-SCNC: 100 MMOL/L — SIGNIFICANT CHANGE UP (ref 98–110)
CHLORIDE SERPL-SCNC: 99 MMOL/L — SIGNIFICANT CHANGE UP (ref 98–110)
CO2 SERPL-SCNC: 24 MMOL/L — SIGNIFICANT CHANGE UP (ref 17–32)
CO2 SERPL-SCNC: 25 MMOL/L — SIGNIFICANT CHANGE UP (ref 17–32)
CREAT SERPL-MCNC: 1.7 MG/DL — HIGH (ref 0.7–1.5)
CREAT SERPL-MCNC: 1.9 MG/DL — HIGH (ref 0.7–1.5)
EGFR: 39 ML/MIN/1.73M2 — LOW
EGFR: 44 ML/MIN/1.73M2 — LOW
EOSINOPHIL # BLD AUTO: 0.02 K/UL — SIGNIFICANT CHANGE UP (ref 0–0.7)
EOSINOPHIL NFR BLD AUTO: 0.4 % — SIGNIFICANT CHANGE UP (ref 0–8)
FLUAV AG NPH QL: SIGNIFICANT CHANGE UP
FLUBV AG NPH QL: SIGNIFICANT CHANGE UP
GLUCOSE SERPL-MCNC: 103 MG/DL — HIGH (ref 70–99)
GLUCOSE SERPL-MCNC: 86 MG/DL — SIGNIFICANT CHANGE UP (ref 70–99)
HCT VFR BLD CALC: 46.9 % — SIGNIFICANT CHANGE UP (ref 42–52)
HGB BLD-MCNC: 15.8 G/DL — SIGNIFICANT CHANGE UP (ref 14–18)
IMM GRANULOCYTES NFR BLD AUTO: 0.2 % — SIGNIFICANT CHANGE UP (ref 0.1–0.3)
LYMPHOCYTES # BLD AUTO: 1.1 K/UL — LOW (ref 1.2–3.4)
LYMPHOCYTES # BLD AUTO: 22.1 % — SIGNIFICANT CHANGE UP (ref 20.5–51.1)
MCHC RBC-ENTMCNC: 27 PG — SIGNIFICANT CHANGE UP (ref 27–31)
MCHC RBC-ENTMCNC: 33.7 G/DL — SIGNIFICANT CHANGE UP (ref 32–37)
MCV RBC AUTO: 80.2 FL — SIGNIFICANT CHANGE UP (ref 80–94)
MONOCYTES # BLD AUTO: 0.81 K/UL — HIGH (ref 0.1–0.6)
MONOCYTES NFR BLD AUTO: 16.3 % — HIGH (ref 1.7–9.3)
NEUTROPHILS # BLD AUTO: 3 K/UL — SIGNIFICANT CHANGE UP (ref 1.4–6.5)
NEUTROPHILS NFR BLD AUTO: 60.4 % — SIGNIFICANT CHANGE UP (ref 42.2–75.2)
NRBC # BLD: 0 /100 WBCS — SIGNIFICANT CHANGE UP (ref 0–0)
PLATELET # BLD AUTO: 107 K/UL — LOW (ref 130–400)
POTASSIUM SERPL-MCNC: 4.2 MMOL/L — SIGNIFICANT CHANGE UP (ref 3.5–5)
POTASSIUM SERPL-MCNC: 4.2 MMOL/L — SIGNIFICANT CHANGE UP (ref 3.5–5)
POTASSIUM SERPL-SCNC: 4.2 MMOL/L — SIGNIFICANT CHANGE UP (ref 3.5–5)
POTASSIUM SERPL-SCNC: 4.2 MMOL/L — SIGNIFICANT CHANGE UP (ref 3.5–5)
PROT SERPL-MCNC: 6.1 G/DL — SIGNIFICANT CHANGE UP (ref 6–8)
PROT SERPL-MCNC: 6.3 G/DL — SIGNIFICANT CHANGE UP (ref 6–8)
RBC # BLD: 5.85 M/UL — SIGNIFICANT CHANGE UP (ref 4.7–6.1)
RBC # FLD: 13.7 % — SIGNIFICANT CHANGE UP (ref 11.5–14.5)
RSV RNA NPH QL NAA+NON-PROBE: SIGNIFICANT CHANGE UP
SARS-COV-2 RNA SPEC QL NAA+PROBE: SIGNIFICANT CHANGE UP
SODIUM SERPL-SCNC: 134 MMOL/L — LOW (ref 135–146)
SODIUM SERPL-SCNC: 136 MMOL/L — SIGNIFICANT CHANGE UP (ref 135–146)
TROPONIN T SERPL-MCNC: <0.01 NG/ML — SIGNIFICANT CHANGE UP
WBC # BLD: 4.97 K/UL — SIGNIFICANT CHANGE UP (ref 4.8–10.8)
WBC # FLD AUTO: 4.97 K/UL — SIGNIFICANT CHANGE UP (ref 4.8–10.8)

## 2023-02-13 PROCEDURE — 71046 X-RAY EXAM CHEST 2 VIEWS: CPT

## 2023-02-13 PROCEDURE — 99285 EMERGENCY DEPT VISIT HI MDM: CPT

## 2023-02-13 PROCEDURE — 93010 ELECTROCARDIOGRAM REPORT: CPT

## 2023-02-13 PROCEDURE — 84484 ASSAY OF TROPONIN QUANT: CPT

## 2023-02-13 PROCEDURE — 96374 THER/PROPH/DIAG INJ IV PUSH: CPT

## 2023-02-13 PROCEDURE — 71046 X-RAY EXAM CHEST 2 VIEWS: CPT | Mod: 26

## 2023-02-13 PROCEDURE — 0241U: CPT

## 2023-02-13 PROCEDURE — 85025 COMPLETE CBC W/AUTO DIFF WBC: CPT

## 2023-02-13 PROCEDURE — 93005 ELECTROCARDIOGRAM TRACING: CPT

## 2023-02-13 PROCEDURE — 80053 COMPREHEN METABOLIC PANEL: CPT

## 2023-02-13 PROCEDURE — 99285 EMERGENCY DEPT VISIT HI MDM: CPT | Mod: 25

## 2023-02-13 PROCEDURE — 36415 COLL VENOUS BLD VENIPUNCTURE: CPT

## 2023-02-13 RX ORDER — SODIUM CHLORIDE 9 MG/ML
1000 INJECTION, SOLUTION INTRAVENOUS ONCE
Refills: 0 | Status: COMPLETED | OUTPATIENT
Start: 2023-02-13 | End: 2023-02-13

## 2023-02-13 RX ADMIN — Medication 40 MILLIGRAM(S): at 20:19

## 2023-02-13 RX ADMIN — SODIUM CHLORIDE 1000 MILLILITER(S): 9 INJECTION, SOLUTION INTRAVENOUS at 21:03

## 2023-02-13 NOTE — ED ADULT NURSE NOTE - OBJECTIVE STATEMENT
pt complaining of cough with phelgm and wheezing for over a week   pt not In respiratory distress  pt appears to be breathing comfortably, no complaints of shortness of breath  pt denies sick contacts

## 2023-02-13 NOTE — ED PROVIDER NOTE - PROGRESS NOTE DETAILS
Tahir: Cr 1.9. Discussed with patient, pt states Dr. Galeana previously told him his kidney function tests elevated but he does not remember creatinine.

## 2023-02-13 NOTE — ED PROVIDER NOTE - ATTENDING CONTRIBUTION TO CARE
I have personally performed a history and physical exam on this patient and personally directed the management of the patient. 7 patient is a 67-year-old male past medical history of hypertension presents with cough over the past 1 week denies any headache visual changes states he has intermittent chest pain associated with cough no shortness of breath no exertional component denies abdominal pain back pain vomiting fevers chills patient taking over-the-counter cough medicine with no relief was seen by his primary care doctor and is aware    On physical exam patient is normocephalic atraumatic pupils equal round reactive light accommodation extraocular muscles intact oropharynx clear chest clear to station bilaterally abdomen soft nontender nondistended bowel sounds positive no guarding no rebound extremities full range of motion pedal pulse 2+ radial pulse 2+    Assessment plan patient overall is well-appearing well-hydrated we obtained EKG per my independent evaluation not consistent with STEMI obtain x-ray per my independent evaluation axis with pneumonia given the patient has chest pain and his age we obtained troponin which is negative patient improved here in the emergency department I will initiate p.o. antibiotics considering duration of symptoms in addition patient was found to have a creatinine 1.9 he is aware of this states his kidney function has been elevated in the past given IV fluids downtrending the movement

## 2023-02-13 NOTE — ED PROVIDER NOTE - CLINICAL SUMMARY MEDICAL DECISION MAKING FREE TEXT BOX
Assessment plan patient overall is well-appearing well-hydrated we obtained EKG per my independent evaluation not consistent with STEMI obtain x-ray per my independent evaluation axis with pneumonia given the patient has chest pain and his age we obtained troponin which is negative patient improved here in the emergency department I will initiate p.o. antibiotics considering duration of symptoms in addition patient was found to have a creatinine 1.9 he is aware of this states his kidney function has been elevated in the past given IV fluids downtrending the movement

## 2023-02-13 NOTE — ED PROVIDER NOTE - PATIENT PORTAL LINK FT
You can access the FollowMyHealth Patient Portal offered by [facility] by registering at the following website: [email or web address]. By joining [employer]s FollowMyHealth portal, you will also be able to view your health information using other applications (apps) compatible with our system.

## 2023-02-14 RX ADMIN — Medication 100 MILLIGRAM(S): at 00:51

## 2023-05-27 ENCOUNTER — EMERGENCY (EMERGENCY)
Facility: HOSPITAL | Age: 68
LOS: 0 days | Discharge: HOME | End: 2023-05-27
Attending: EMERGENCY MEDICINE
Payer: MEDICARE

## 2023-05-27 VITALS
HEART RATE: 68 BPM | WEIGHT: 214.95 LBS | RESPIRATION RATE: 20 BRPM | OXYGEN SATURATION: 99 % | HEIGHT: 69 IN | TEMPERATURE: 98 F | DIASTOLIC BLOOD PRESSURE: 96 MMHG | SYSTOLIC BLOOD PRESSURE: 152 MMHG

## 2023-05-27 DIAGNOSIS — R20.0 ANESTHESIA OF SKIN: ICD-10-CM

## 2023-05-27 DIAGNOSIS — M54.2 CERVICALGIA: ICD-10-CM

## 2023-05-27 DIAGNOSIS — M54.12 RADICULOPATHY, CERVICAL REGION: ICD-10-CM

## 2023-05-27 DIAGNOSIS — R20.2 PARESTHESIA OF SKIN: ICD-10-CM

## 2023-05-27 DIAGNOSIS — I10 ESSENTIAL (PRIMARY) HYPERTENSION: ICD-10-CM

## 2023-05-27 PROCEDURE — 93010 ELECTROCARDIOGRAM REPORT: CPT

## 2023-05-27 PROCEDURE — 93005 ELECTROCARDIOGRAM TRACING: CPT

## 2023-05-27 PROCEDURE — 99283 EMERGENCY DEPT VISIT LOW MDM: CPT | Mod: FS

## 2023-05-27 PROCEDURE — 99283 EMERGENCY DEPT VISIT LOW MDM: CPT

## 2023-05-27 NOTE — ED PROVIDER NOTE - PHYSICAL EXAMINATION
Physical Exam    Constitutional: No acute distress.   Eyes: Conjunctiva pink, Sclera clear, PERRLA, EOMI.  ENT: No sinus tenderness. No nasal discharge. No oropharyngeal erythema, edema, or exudates. Uvula midline.   Cardiovascular: Regular rate, regular rhythm. No noted murmurs rubs or gallops.  Respiratory: unlabored respiratory effort, clear to auscultation bilaterally no wheezing, rales or rhonchi  Gastrointestinal: Normal bowel sounds. soft, non distended, non-tender abdomen.   Musculoskeletal: supple neck, no midline tenderness. No joint or bony deformity. Subjective numbness left upper extremity, normal strength. Pulses strong normal Judha test LUE  Integumentary: warm, dry, no rash  Neurologic: awake, alert, cranial nerves II-XII grossly intact, extremities’ motor and sensory functions grossly intact  Psychiatric: appropriate mood, appropriate affect

## 2023-05-27 NOTE — ED ADULT TRIAGE NOTE - CHIEF COMPLAINT QUOTE
Pt states he has had left arm "tingling" x months he usually is able to shake the tingling away.  Last night sensation was worse.

## 2023-05-27 NOTE — ED PROVIDER NOTE - PATIENT PORTAL LINK FT
You can access the FollowMyHealth Patient Portal offered by [facility] by registering at the following website: [email or web address]. By joining [employer]’s FollowMyHealth portal, you will also be able to view your health information using other applications (apps) compatible with our system.

## 2023-05-27 NOTE — ED PROVIDER NOTE - NSFOLLOWUPCLINICS_GEN_ALL_ED_FT
Neurosurgery at [facility]  Neurosurgery  [address] [place] [address], [address]  [place] [address]  Phone: [phone]  Fax:

## 2023-05-27 NOTE — ED PROVIDER NOTE - OBJECTIVE STATEMENT
68 year old male with a history of HTN presents to the ED with left arm numbness and tingling. Patient has been experiencing neck pain, upper back pain and left upper extremity numbness and tingling extending to his hand for the past few months. Last night the numbness and tingling worsened and lasted for 2 hours kept him up. He has been seen by Neurology had an MRI of his cervical spine WNL, underwent PT, and is currently on a low dose of Gabapentin, Flexeril, and Naproxen without relief. Denies headache, vision changes, slurred speech, weakness of the hand, fever, chills, chest pain, shortness of breath, abdominal pain, nausea, vomiting, diarrhea, constipation, dysuria, hematuria, lower extremity swelling, rash.

## 2023-05-27 NOTE — ED PROVIDER NOTE - NSFOLLOWUPINSTRUCTIONS_ED_ALL_ED_FT
Please follow up with Neurosurgery and Pain management outpatient.     Our Emergency Department Referral Coordinators will be reaching out to you in the next 24-48 hours from 9:00am to 5:00pm with a follow up appointment. Please expect a phone call from the hospital in that time frame. If you do not receive a call or if you have any questions or concerns, you can reach them at [phone] or [phone].     Cervical Radiculopathy    WHAT YOU NEED TO KNOW:    Cervical radiculopathy is a painful condition that happens when a spinal nerve in your neck is pinched or irritated.     DISCHARGE INSTRUCTIONS:    Medicines: You may need any of the following:  •NSAIDs help decrease swelling and pain. This medicine can be bought without a doctor's order. This medicine can cause stomach bleeding or kidney problems in certain people. If you take blood thinner medicine, always ask your healthcare provider if NSAIDs are safe for you. Always read the medicine label and follow the directions on it before using this medicine.  •Prescription pain medicine helps decrease pain. Do not wait until the pain is severe before you take this medicine.  •Steroids help decrease pain and swelling. These may be given as a pill or as an injection in your neck. You may need more than 1 injection if your symptoms do not improve after the first treatment.   •Take your medicine as directed. Contact your healthcare provider if you think your medicine is not helping or if you have side effects. Tell him of her if you are allergic to any medicine. Keep a list of the medicines, vitamins, and herbs you take. Include the amounts, and when and why you take them. Bring the list or the pill bottles to follow-up visits. Carry your medicine list with you in case of an emergency.    Follow up with your healthcare provider or spine specialist as directed: Write down your questions so you remember to ask them during your visits.     Physical therapy: Your healthcare provider may suggest physical therapy to stretch and strengthen your muscles. Your physical therapist can teach you how to improve your posture and the way you hold your neck. He may also teach you how to be safely active and avoid further injury. He can also help you develop an exercise program that is safe for your back and neck.     Self-care:   •Ice helps decrease swelling and pain. Ice may also help prevent tissue damage. Use an ice pack, or put crushed ice in a plastic bag. Cover it with a towel and place it on your neck for 15 to 20 minutes every hour or as directed.  •Rest when you feel it is needed. Slowly start to do more each day. Return to your daily activities as directed.   •Wear a soft collar. You may be given a soft collar to support your neck while you sleep. Wear the soft collar only as directed.  •Do light stretches and regular exercise. Your healthcare provider may suggest light stretches to help decrease stiffness in your neck and arm as you recover. After your pain is controlled, you may benefit from regular exercise. Ask what type of exercise is safe for your back and neck.   •Review your work area. A comfortable work area can help prevent neck strain. Ask your employer for an ergonomic review to check the position of your desk, chair, phone, and computer. Make any necessary adjustments for your comfort.     Contact your healthcare provider or spine specialist if:   •You have a fever.  •You are losing weight without trying.  •Your pain is worse, even with medicine.  •One or both hands feel more numb than before, or you cannot move your fingers well.  •You have questions or concerns about your condition or care.

## 2023-05-27 NOTE — ED PROVIDER NOTE - ATTENDING APP SHARED VISIT CONTRIBUTION OF CARE
patient with chronic radiculopathy s/p MRI and EMG followed by neurologist noticed increased numbness and tingling to fingers, no change in color or temperature, no pain to fingers, PE as above, will d/c to f/u with neurosurg, also to address his neurontin dosing with his neurologist. Patient counseled regarding conditions which should prompt return.

## 2023-05-27 NOTE — ED PROVIDER NOTE - CARE PROVIDER_API CALL
Faisal Nguyen  Pain Medicine  [address] [place] [address]  Phone: [phone]  Fax: [phone]  Follow Up Time:

## 2023-10-18 ENCOUNTER — APPOINTMENT (OUTPATIENT)
Dept: ORTHOPEDIC SURGERY | Facility: CLINIC | Age: 68
End: 2023-10-18
Payer: MEDICARE

## 2023-10-18 VITALS — BODY MASS INDEX: 31.4 KG/M2 | HEIGHT: 69 IN | WEIGHT: 212 LBS

## 2023-10-18 PROCEDURE — 99214 OFFICE O/P EST MOD 30 MIN: CPT

## 2023-10-18 PROCEDURE — 73030 X-RAY EXAM OF SHOULDER: CPT | Mod: RT

## 2023-10-29 ENCOUNTER — TRANSCRIPTION ENCOUNTER (OUTPATIENT)
Age: 68
End: 2023-10-29

## 2023-10-29 ENCOUNTER — INPATIENT (INPATIENT)
Facility: HOSPITAL | Age: 68
LOS: 0 days | Discharge: HOME | DRG: 354 | End: 2023-10-30
Attending: SURGERY | Admitting: SURGERY
Payer: MEDICARE

## 2023-10-29 ENCOUNTER — RESULT REVIEW (OUTPATIENT)
Age: 68
End: 2023-10-29

## 2023-10-29 VITALS
HEART RATE: 85 BPM | HEIGHT: 70 IN | RESPIRATION RATE: 18 BRPM | TEMPERATURE: 97 F | SYSTOLIC BLOOD PRESSURE: 158 MMHG | WEIGHT: 212.08 LBS | DIASTOLIC BLOOD PRESSURE: 96 MMHG | OXYGEN SATURATION: 98 %

## 2023-10-29 DIAGNOSIS — K42.9 UMBILICAL HERNIA WITHOUT OBSTRUCTION OR GANGRENE: ICD-10-CM

## 2023-10-29 LAB
ALBUMIN SERPL ELPH-MCNC: 3.9 G/DL — SIGNIFICANT CHANGE UP (ref 3.5–5.2)
ALBUMIN SERPL ELPH-MCNC: 3.9 G/DL — SIGNIFICANT CHANGE UP (ref 3.5–5.2)
ALP SERPL-CCNC: 86 U/L — SIGNIFICANT CHANGE UP (ref 30–115)
ALP SERPL-CCNC: 86 U/L — SIGNIFICANT CHANGE UP (ref 30–115)
ALT FLD-CCNC: 12 U/L — SIGNIFICANT CHANGE UP (ref 0–41)
ALT FLD-CCNC: 12 U/L — SIGNIFICANT CHANGE UP (ref 0–41)
ANION GAP SERPL CALC-SCNC: 10 MMOL/L — SIGNIFICANT CHANGE UP (ref 7–14)
ANION GAP SERPL CALC-SCNC: 10 MMOL/L — SIGNIFICANT CHANGE UP (ref 7–14)
AST SERPL-CCNC: 17 U/L — SIGNIFICANT CHANGE UP (ref 0–41)
AST SERPL-CCNC: 17 U/L — SIGNIFICANT CHANGE UP (ref 0–41)
BASOPHILS # BLD AUTO: 0.1 K/UL — SIGNIFICANT CHANGE UP (ref 0–0.2)
BASOPHILS # BLD AUTO: 0.1 K/UL — SIGNIFICANT CHANGE UP (ref 0–0.2)
BASOPHILS NFR BLD AUTO: 1.3 % — HIGH (ref 0–1)
BASOPHILS NFR BLD AUTO: 1.3 % — HIGH (ref 0–1)
BILIRUB SERPL-MCNC: 0.5 MG/DL — SIGNIFICANT CHANGE UP (ref 0.2–1.2)
BILIRUB SERPL-MCNC: 0.5 MG/DL — SIGNIFICANT CHANGE UP (ref 0.2–1.2)
BLD GP AB SCN SERPL QL: SIGNIFICANT CHANGE UP
BLD GP AB SCN SERPL QL: SIGNIFICANT CHANGE UP
BUN SERPL-MCNC: 29 MG/DL — HIGH (ref 10–20)
BUN SERPL-MCNC: 29 MG/DL — HIGH (ref 10–20)
CALCIUM SERPL-MCNC: 9.3 MG/DL — SIGNIFICANT CHANGE UP (ref 8.4–10.5)
CALCIUM SERPL-MCNC: 9.3 MG/DL — SIGNIFICANT CHANGE UP (ref 8.4–10.5)
CHLORIDE SERPL-SCNC: 104 MMOL/L — SIGNIFICANT CHANGE UP (ref 98–110)
CHLORIDE SERPL-SCNC: 104 MMOL/L — SIGNIFICANT CHANGE UP (ref 98–110)
CO2 SERPL-SCNC: 22 MMOL/L — SIGNIFICANT CHANGE UP (ref 17–32)
CO2 SERPL-SCNC: 22 MMOL/L — SIGNIFICANT CHANGE UP (ref 17–32)
CREAT SERPL-MCNC: 1.8 MG/DL — HIGH (ref 0.7–1.5)
CREAT SERPL-MCNC: 1.8 MG/DL — HIGH (ref 0.7–1.5)
EGFR: 41 ML/MIN/1.73M2 — LOW
EGFR: 41 ML/MIN/1.73M2 — LOW
EOSINOPHIL # BLD AUTO: 0.18 K/UL — SIGNIFICANT CHANGE UP (ref 0–0.7)
EOSINOPHIL # BLD AUTO: 0.18 K/UL — SIGNIFICANT CHANGE UP (ref 0–0.7)
EOSINOPHIL NFR BLD AUTO: 2.3 % — SIGNIFICANT CHANGE UP (ref 0–8)
EOSINOPHIL NFR BLD AUTO: 2.3 % — SIGNIFICANT CHANGE UP (ref 0–8)
GLUCOSE SERPL-MCNC: 98 MG/DL — SIGNIFICANT CHANGE UP (ref 70–99)
GLUCOSE SERPL-MCNC: 98 MG/DL — SIGNIFICANT CHANGE UP (ref 70–99)
HCT VFR BLD CALC: 47.7 % — SIGNIFICANT CHANGE UP (ref 42–52)
HCT VFR BLD CALC: 47.7 % — SIGNIFICANT CHANGE UP (ref 42–52)
HGB BLD-MCNC: 16.6 G/DL — SIGNIFICANT CHANGE UP (ref 14–18)
HGB BLD-MCNC: 16.6 G/DL — SIGNIFICANT CHANGE UP (ref 14–18)
IMM GRANULOCYTES NFR BLD AUTO: 0.4 % — HIGH (ref 0.1–0.3)
IMM GRANULOCYTES NFR BLD AUTO: 0.4 % — HIGH (ref 0.1–0.3)
LACTATE SERPL-SCNC: 0.7 MMOL/L — SIGNIFICANT CHANGE UP (ref 0.7–2)
LACTATE SERPL-SCNC: 0.7 MMOL/L — SIGNIFICANT CHANGE UP (ref 0.7–2)
LIDOCAIN IGE QN: 39 U/L — SIGNIFICANT CHANGE UP (ref 7–60)
LIDOCAIN IGE QN: 39 U/L — SIGNIFICANT CHANGE UP (ref 7–60)
LYMPHOCYTES # BLD AUTO: 1.81 K/UL — SIGNIFICANT CHANGE UP (ref 1.2–3.4)
LYMPHOCYTES # BLD AUTO: 1.81 K/UL — SIGNIFICANT CHANGE UP (ref 1.2–3.4)
LYMPHOCYTES # BLD AUTO: 23.6 % — SIGNIFICANT CHANGE UP (ref 20.5–51.1)
LYMPHOCYTES # BLD AUTO: 23.6 % — SIGNIFICANT CHANGE UP (ref 20.5–51.1)
MCHC RBC-ENTMCNC: 28.7 PG — SIGNIFICANT CHANGE UP (ref 27–31)
MCHC RBC-ENTMCNC: 28.7 PG — SIGNIFICANT CHANGE UP (ref 27–31)
MCHC RBC-ENTMCNC: 34.8 G/DL — SIGNIFICANT CHANGE UP (ref 32–37)
MCHC RBC-ENTMCNC: 34.8 G/DL — SIGNIFICANT CHANGE UP (ref 32–37)
MCV RBC AUTO: 82.4 FL — SIGNIFICANT CHANGE UP (ref 80–94)
MCV RBC AUTO: 82.4 FL — SIGNIFICANT CHANGE UP (ref 80–94)
MONOCYTES # BLD AUTO: 0.81 K/UL — HIGH (ref 0.1–0.6)
MONOCYTES # BLD AUTO: 0.81 K/UL — HIGH (ref 0.1–0.6)
MONOCYTES NFR BLD AUTO: 10.5 % — HIGH (ref 1.7–9.3)
MONOCYTES NFR BLD AUTO: 10.5 % — HIGH (ref 1.7–9.3)
NEUTROPHILS # BLD AUTO: 4.75 K/UL — SIGNIFICANT CHANGE UP (ref 1.4–6.5)
NEUTROPHILS # BLD AUTO: 4.75 K/UL — SIGNIFICANT CHANGE UP (ref 1.4–6.5)
NEUTROPHILS NFR BLD AUTO: 61.9 % — SIGNIFICANT CHANGE UP (ref 42.2–75.2)
NEUTROPHILS NFR BLD AUTO: 61.9 % — SIGNIFICANT CHANGE UP (ref 42.2–75.2)
NRBC # BLD: 0 /100 WBCS — SIGNIFICANT CHANGE UP (ref 0–0)
NRBC # BLD: 0 /100 WBCS — SIGNIFICANT CHANGE UP (ref 0–0)
PLATELET # BLD AUTO: 155 K/UL — SIGNIFICANT CHANGE UP (ref 130–400)
PLATELET # BLD AUTO: 155 K/UL — SIGNIFICANT CHANGE UP (ref 130–400)
PMV BLD: 10.8 FL — HIGH (ref 7.4–10.4)
PMV BLD: 10.8 FL — HIGH (ref 7.4–10.4)
POTASSIUM SERPL-MCNC: 4.3 MMOL/L — SIGNIFICANT CHANGE UP (ref 3.5–5)
POTASSIUM SERPL-MCNC: 4.3 MMOL/L — SIGNIFICANT CHANGE UP (ref 3.5–5)
POTASSIUM SERPL-SCNC: 4.3 MMOL/L — SIGNIFICANT CHANGE UP (ref 3.5–5)
POTASSIUM SERPL-SCNC: 4.3 MMOL/L — SIGNIFICANT CHANGE UP (ref 3.5–5)
PROT SERPL-MCNC: 6 G/DL — SIGNIFICANT CHANGE UP (ref 6–8)
PROT SERPL-MCNC: 6 G/DL — SIGNIFICANT CHANGE UP (ref 6–8)
RBC # BLD: 5.79 M/UL — SIGNIFICANT CHANGE UP (ref 4.7–6.1)
RBC # BLD: 5.79 M/UL — SIGNIFICANT CHANGE UP (ref 4.7–6.1)
RBC # FLD: 13.5 % — SIGNIFICANT CHANGE UP (ref 11.5–14.5)
RBC # FLD: 13.5 % — SIGNIFICANT CHANGE UP (ref 11.5–14.5)
SODIUM SERPL-SCNC: 136 MMOL/L — SIGNIFICANT CHANGE UP (ref 135–146)
SODIUM SERPL-SCNC: 136 MMOL/L — SIGNIFICANT CHANGE UP (ref 135–146)
WBC # BLD: 7.68 K/UL — SIGNIFICANT CHANGE UP (ref 4.8–10.8)
WBC # BLD: 7.68 K/UL — SIGNIFICANT CHANGE UP (ref 4.8–10.8)
WBC # FLD AUTO: 7.68 K/UL — SIGNIFICANT CHANGE UP (ref 4.8–10.8)
WBC # FLD AUTO: 7.68 K/UL — SIGNIFICANT CHANGE UP (ref 4.8–10.8)

## 2023-10-29 PROCEDURE — 84100 ASSAY OF PHOSPHORUS: CPT

## 2023-10-29 PROCEDURE — 88313 SPECIAL STAINS GROUP 2: CPT | Mod: 26

## 2023-10-29 PROCEDURE — 12032 INTMD RPR S/A/T/EXT 2.6-7.5: CPT | Mod: 59

## 2023-10-29 PROCEDURE — 93010 ELECTROCARDIOGRAM REPORT: CPT

## 2023-10-29 PROCEDURE — 88341 IMHCHEM/IMCYTCHM EA ADD ANTB: CPT

## 2023-10-29 PROCEDURE — 74176 CT ABD & PELVIS W/O CONTRAST: CPT | Mod: 26,MA

## 2023-10-29 PROCEDURE — 88360 TUMOR IMMUNOHISTOCHEM/MANUAL: CPT | Mod: 26

## 2023-10-29 PROCEDURE — 85025 COMPLETE CBC W/AUTO DIFF WBC: CPT

## 2023-10-29 PROCEDURE — C9113: CPT

## 2023-10-29 PROCEDURE — 99222 1ST HOSP IP/OBS MODERATE 55: CPT | Mod: 25

## 2023-10-29 PROCEDURE — 88302 TISSUE EXAM BY PATHOLOGIST: CPT | Mod: 26

## 2023-10-29 PROCEDURE — 83735 ASSAY OF MAGNESIUM: CPT

## 2023-10-29 PROCEDURE — 88360 TUMOR IMMUNOHISTOCHEM/MANUAL: CPT

## 2023-10-29 PROCEDURE — 88302 TISSUE EXAM BY PATHOLOGIST: CPT

## 2023-10-29 PROCEDURE — 99285 EMERGENCY DEPT VISIT HI MDM: CPT

## 2023-10-29 PROCEDURE — 88342 IMHCHEM/IMCYTCHM 1ST ANTB: CPT | Mod: 26,59

## 2023-10-29 PROCEDURE — 88342 IMHCHEM/IMCYTCHM 1ST ANTB: CPT

## 2023-10-29 PROCEDURE — 80048 BASIC METABOLIC PNL TOTAL CA: CPT

## 2023-10-29 PROCEDURE — 49592 RPR AA HRN 1ST < 3 NCR/STRN: CPT

## 2023-10-29 PROCEDURE — 86803 HEPATITIS C AB TEST: CPT

## 2023-10-29 PROCEDURE — 93005 ELECTROCARDIOGRAM TRACING: CPT

## 2023-10-29 RX ORDER — HEPARIN SODIUM 5000 [USP'U]/ML
5000 INJECTION INTRAVENOUS; SUBCUTANEOUS EVERY 12 HOURS
Refills: 0 | Status: DISCONTINUED | OUTPATIENT
Start: 2023-10-29 | End: 2023-10-29

## 2023-10-29 RX ORDER — ONDANSETRON 8 MG/1
4 TABLET, FILM COATED ORAL EVERY 8 HOURS
Refills: 0 | Status: DISCONTINUED | OUTPATIENT
Start: 2023-10-29 | End: 2023-10-29

## 2023-10-29 RX ORDER — CEFAZOLIN SODIUM 1 G
2000 VIAL (EA) INJECTION EVERY 8 HOURS
Refills: 0 | Status: DISCONTINUED | OUTPATIENT
Start: 2023-10-29 | End: 2023-10-29

## 2023-10-29 RX ORDER — TAMSULOSIN HYDROCHLORIDE 0.4 MG/1
0.4 CAPSULE ORAL AT BEDTIME
Refills: 0 | Status: DISCONTINUED | OUTPATIENT
Start: 2023-10-29 | End: 2023-10-30

## 2023-10-29 RX ORDER — LANOLIN ALCOHOL/MO/W.PET/CERES
3 CREAM (GRAM) TOPICAL AT BEDTIME
Refills: 0 | Status: DISCONTINUED | OUTPATIENT
Start: 2023-10-29 | End: 2023-10-29

## 2023-10-29 RX ORDER — MORPHINE SULFATE 50 MG/1
2 CAPSULE, EXTENDED RELEASE ORAL EVERY 6 HOURS
Refills: 0 | Status: DISCONTINUED | OUTPATIENT
Start: 2023-10-29 | End: 2023-10-30

## 2023-10-29 RX ORDER — LOSARTAN POTASSIUM 100 MG/1
50 TABLET, FILM COATED ORAL DAILY
Refills: 0 | Status: DISCONTINUED | OUTPATIENT
Start: 2023-10-29 | End: 2023-10-30

## 2023-10-29 RX ORDER — LANOLIN ALCOHOL/MO/W.PET/CERES
3 CREAM (GRAM) TOPICAL AT BEDTIME
Refills: 0 | Status: DISCONTINUED | OUTPATIENT
Start: 2023-10-29 | End: 2023-10-30

## 2023-10-29 RX ORDER — SODIUM CHLORIDE 9 MG/ML
1000 INJECTION INTRAMUSCULAR; INTRAVENOUS; SUBCUTANEOUS ONCE
Refills: 0 | Status: COMPLETED | OUTPATIENT
Start: 2023-10-29 | End: 2023-10-29

## 2023-10-29 RX ORDER — SODIUM CHLORIDE 9 MG/ML
1000 INJECTION INTRAMUSCULAR; INTRAVENOUS; SUBCUTANEOUS
Refills: 0 | Status: DISCONTINUED | OUTPATIENT
Start: 2023-10-29 | End: 2023-10-29

## 2023-10-29 RX ORDER — SODIUM CHLORIDE 9 MG/ML
1000 INJECTION, SOLUTION INTRAVENOUS
Refills: 0 | Status: DISCONTINUED | OUTPATIENT
Start: 2023-10-29 | End: 2023-10-29

## 2023-10-29 RX ORDER — LOSARTAN POTASSIUM 100 MG/1
50 TABLET, FILM COATED ORAL DAILY
Refills: 0 | Status: DISCONTINUED | OUTPATIENT
Start: 2023-10-29 | End: 2023-10-29

## 2023-10-29 RX ORDER — CEFAZOLIN SODIUM 1 G
2000 VIAL (EA) INJECTION ONCE
Refills: 0 | Status: COMPLETED | OUTPATIENT
Start: 2023-10-29 | End: 2023-10-29

## 2023-10-29 RX ORDER — ONDANSETRON 8 MG/1
4 TABLET, FILM COATED ORAL ONCE
Refills: 0 | Status: DISCONTINUED | OUTPATIENT
Start: 2023-10-29 | End: 2023-10-29

## 2023-10-29 RX ORDER — PANTOPRAZOLE SODIUM 20 MG/1
40 TABLET, DELAYED RELEASE ORAL EVERY 24 HOURS
Refills: 0 | Status: DISCONTINUED | OUTPATIENT
Start: 2023-10-29 | End: 2023-10-29

## 2023-10-29 RX ORDER — CHLORHEXIDINE GLUCONATE 213 G/1000ML
1 SOLUTION TOPICAL
Refills: 0 | Status: DISCONTINUED | OUTPATIENT
Start: 2023-10-29 | End: 2023-10-29

## 2023-10-29 RX ORDER — MORPHINE SULFATE 50 MG/1
4 CAPSULE, EXTENDED RELEASE ORAL ONCE
Refills: 0 | Status: DISCONTINUED | OUTPATIENT
Start: 2023-10-29 | End: 2023-10-29

## 2023-10-29 RX ORDER — AMLODIPINE BESYLATE 2.5 MG/1
5 TABLET ORAL ONCE
Refills: 0 | Status: DISCONTINUED | OUTPATIENT
Start: 2023-10-29 | End: 2023-10-30

## 2023-10-29 RX ORDER — ACETAMINOPHEN 500 MG
650 TABLET ORAL EVERY 6 HOURS
Refills: 0 | Status: DISCONTINUED | OUTPATIENT
Start: 2023-10-29 | End: 2023-10-29

## 2023-10-29 RX ORDER — HYDROMORPHONE HYDROCHLORIDE 2 MG/ML
0.5 INJECTION INTRAMUSCULAR; INTRAVENOUS; SUBCUTANEOUS
Refills: 0 | Status: DISCONTINUED | OUTPATIENT
Start: 2023-10-29 | End: 2023-10-29

## 2023-10-29 RX ORDER — ACETAMINOPHEN 500 MG
650 TABLET ORAL EVERY 6 HOURS
Refills: 0 | Status: DISCONTINUED | OUTPATIENT
Start: 2023-10-29 | End: 2023-10-30

## 2023-10-29 RX ORDER — PANTOPRAZOLE SODIUM 20 MG/1
40 TABLET, DELAYED RELEASE ORAL EVERY 24 HOURS
Refills: 0 | Status: DISCONTINUED | OUTPATIENT
Start: 2023-10-29 | End: 2023-10-30

## 2023-10-29 RX ORDER — CEFAZOLIN SODIUM 1 G
VIAL (EA) INJECTION
Refills: 0 | Status: DISCONTINUED | OUTPATIENT
Start: 2023-10-29 | End: 2023-10-29

## 2023-10-29 RX ORDER — HEPARIN SODIUM 5000 [USP'U]/ML
5000 INJECTION INTRAVENOUS; SUBCUTANEOUS EVERY 12 HOURS
Refills: 0 | Status: DISCONTINUED | OUTPATIENT
Start: 2023-10-29 | End: 2023-10-30

## 2023-10-29 RX ORDER — MORPHINE SULFATE 50 MG/1
2 CAPSULE, EXTENDED RELEASE ORAL EVERY 6 HOURS
Refills: 0 | Status: DISCONTINUED | OUTPATIENT
Start: 2023-10-29 | End: 2023-10-29

## 2023-10-29 RX ORDER — TAMSULOSIN HYDROCHLORIDE 0.4 MG/1
0.4 CAPSULE ORAL AT BEDTIME
Refills: 0 | Status: DISCONTINUED | OUTPATIENT
Start: 2023-10-29 | End: 2023-10-29

## 2023-10-29 RX ORDER — AMLODIPINE BESYLATE 2.5 MG/1
5 TABLET ORAL ONCE
Refills: 0 | Status: DISCONTINUED | OUTPATIENT
Start: 2023-10-29 | End: 2023-10-29

## 2023-10-29 RX ADMIN — MORPHINE SULFATE 4 MILLIGRAM(S): 50 CAPSULE, EXTENDED RELEASE ORAL at 08:45

## 2023-10-29 RX ADMIN — PANTOPRAZOLE SODIUM 40 MILLIGRAM(S): 20 TABLET, DELAYED RELEASE ORAL at 18:14

## 2023-10-29 RX ADMIN — Medication 100 MILLIGRAM(S): at 12:16

## 2023-10-29 RX ADMIN — TAMSULOSIN HYDROCHLORIDE 0.4 MILLIGRAM(S): 0.4 CAPSULE ORAL at 22:57

## 2023-10-29 RX ADMIN — PANTOPRAZOLE SODIUM 40 MILLIGRAM(S): 20 TABLET, DELAYED RELEASE ORAL at 12:16

## 2023-10-29 RX ADMIN — HEPARIN SODIUM 5000 UNIT(S): 5000 INJECTION INTRAVENOUS; SUBCUTANEOUS at 18:14

## 2023-10-29 RX ADMIN — SODIUM CHLORIDE 100 MILLILITER(S): 9 INJECTION INTRAMUSCULAR; INTRAVENOUS; SUBCUTANEOUS at 12:15

## 2023-10-29 RX ADMIN — SODIUM CHLORIDE 100 MILLILITER(S): 9 INJECTION, SOLUTION INTRAVENOUS at 16:49

## 2023-10-29 RX ADMIN — SODIUM CHLORIDE 1000 MILLILITER(S): 9 INJECTION INTRAMUSCULAR; INTRAVENOUS; SUBCUTANEOUS at 08:34

## 2023-10-29 NOTE — CHART NOTE - NSCHARTNOTEFT_GEN_A_CORE
POD#0 s/p umbilical hernia repair   Patient seen at bedside. Currently reports no abdominal pain. No acute complaints at time    Vital Signs Last 24 Hrs  T(C): 36.6 (29 Oct 2023 17:01), Max: 36.9 (29 Oct 2023 13:49)  T(F): 97.8 (29 Oct 2023 17:01), Max: 98.5 (29 Oct 2023 13:49)  HR: 74 (29 Oct 2023 17:16) (69 - 85)  BP: 154/78 (29 Oct 2023 17:16) (142/86 - 163/77)  BP(mean): 109 (29 Oct 2023 16:31) (109 - 109)  RR: 16 (29 Oct 2023 17:16) (15 - 18)  SpO2: 98% (29 Oct 2023 17:16) (92% - 100%)    Parameters below as of 29 Oct 2023 16:31  Patient On (Oxygen Delivery Method): room air        GENERAL:  69y/o Male NAD, resting comfortably.  ABDOMEN: Soft, Nontender, Nondistended x 4 quadrants; Bowel sounds present    Plan:  CLD, May advance to regular as tolerated  Pain control prn   Likely dc in am w/out abx  Monitor for fevers, FU AM labs

## 2023-10-29 NOTE — CHART NOTE - NSCHARTNOTEFT_GEN_A_CORE
PACU ANESTHESIA ADMISSION NOTE      Procedure: umbilical hernia repair, resection of strangulated omentum  ____  Intubated  TV:______       Rate: ______      FiO2: ______    ___x_  Patent Airway    __x__  Full return of protective reflexes    __x__  Full recovery from anesthesia / back to baseline     Vitals:   T:     98.5      R:   19            BP:   149/82  p 78  o2 sat 98    Mental Status:  ___x_ Awake   _____ Alert   _____ Drowsy   _____ Sedated    Nausea/Vomiting:  __x__ NO  ______Yes,   See Post - Op Orders          Pain Scale (0-10):  _0____    Treatment: ____ None    ____ See Post - Op/PCA Orders    Post - Operative Fluids:   ____ Oral   ___x_ See Post - Op Orders    Plan: Discharge:   ___Home       _x____Floor     _____Critical Care    _____  Other:_________________    Comments:

## 2023-10-29 NOTE — BRIEF OPERATIVE NOTE - NSICDXBRIEFPROCEDURE_GEN_ALL_CORE_FT
PROCEDURES:  Repair of umbilical hernia with lipectomy 29-Oct-2023 16:38:01  Brian Jennings  Resection, omentum, open 29-Oct-2023 16:39:11  Brian Jennings

## 2023-10-29 NOTE — H&P ADULT - NSHPLABSRESULTS_GEN_ALL_CORE
HAS QUESTIONS REGARDING PATIENTS ALENDRONATE NEEDING CLEARANCE FOR DENTAL PROCEDURE   
16.6   7.68  )-----------( 155      ( 29 Oct 2023 08:30 )             47.7       10-29    136  |  104  |  29<H>  ----------------------------<  98  4.3   |  22  |  1.8<H>    Ca    9.3      29 Oct 2023 08:30    TPro  6.0  /  Alb  3.9  /  TBili  0.5  /  DBili  x   /  AST  17  /  ALT  12  /  AlkPhos  86  10-29              Urinalysis Basic - ( 29 Oct 2023 08:30 )    Color: x / Appearance: x / SG: x / pH: x  Gluc: 98 mg/dL / Ketone: x  / Bili: x / Urobili: x   Blood: x / Protein: x / Nitrite: x   Leuk Esterase: x / RBC: x / WBC x   Sq Epi: x / Non Sq Epi: x / Bacteria: x        Lactate Trend  10-29 @ 08:30 Lactate:0.7     < from: CT Abdomen and Pelvis No Cont (10.29.23 @ 08:54) >    IMPRESSION:    1. Small paraumbilical hernia containing mesenteric fat with associated   soft tissue thickening/infiltration; clinical correlation for the   presence of incarceration is suggested.  2. Indeterminate 8 mm cystic lesion is noted within the pancreatic   uncinate process, not definitely identified on prior exam; further   evaluation with contrast-enhanced pancreatic protocolMRI/MRCP is   suggested    --- End of Report ---

## 2023-10-29 NOTE — ED ADULT TRIAGE NOTE - IDEAL BODY WEIGHT(KG)
73 Pain Refusal Text: I offered to prescribe pain medication but the patient refused to take this medication.

## 2023-10-29 NOTE — H&P ADULT - HISTORY OF PRESENT ILLNESS
Patient is a 67yo male w/ PMHx of HTN, BPH, recent diagnosis of kidney disease, Umbilical hernia presenting w/ irredufceable umbical hernia. Reports has had this her for 2-3 years and usually is able to reduce it on its own, however yesterday morning her noted the protrusion of the hernia and was unable to reduced depite multiple attempt. Reports since than he noticed the surrounding skin becoming erythematous and worsening pain. Reports the pain in general is 5/10, 9/10 when touching the hernia. Denies fever, chills, n/v, chest pain, sob, changes in BMs, or any urinary sxs.

## 2023-10-29 NOTE — ED PROVIDER NOTE - CLINICAL SUMMARY MEDICAL DECISION MAKING FREE TEXT BOX
Patient with history of umbilical hernia now and is able to push it in and is noticing surrounding skin changes.  here exam is concerning for strangulated versus incarcerated hernia.  Patient was seen immediately by surgery and plans for attempted reduction after CT scan.  After CT done the hernia was not able to be reduced and patient will be admitted for further evaluation and treatment by surgery.

## 2023-10-29 NOTE — H&P ADULT - ASSESSMENT
Patient is a 67yo male w/ PMHx of HTN, BPH, recent diagnosis of kidney disease, Umbilical hernia for almost two years presenting w/ complaints of abdominal pain 2/2 to irreducible umbilical hernia since yesterday morning with superficial skin changes. Patient seen at bedside w/ Dr. Daniel, despite multiple attempts were unable to reduce the hernia, now being admitted for hernia repair w. Dr. Watson.     #Para umbilical Hernia   - Afebrile, WBC 7.68, Irr  - CT AP: Small paraumbilical hernia containing mesenteric fat with associated   soft tissue thickening/infiltration  - NPO  - IVF  - Ancef  - Pain control prn   - OR today for hernia reports     #HTN  - c/w norvasc 5mg, losartan 20mg   - Monitor vitals     #BPH  - c/w flomax     #Kidney Disease  - Reports recent diagnosis, has an upcoming inital appointment w/ nephro in 1m   - Baseline Cr 1.9  - Current Cr 1.8  - Will monitor     #Pancreatic cystic lesion  - Incidental finding on CT   - CT AP w/ 8 mm cystic lesion is noted within the pancreatic   uncinate process, not definitely identified on prior exam; further   evaluation with contrast-enhanced pancreatic protocol MRI/MRCP is   suggested  - Outpatient FU     Diet: NPO   Activity: AAT  VTE PPX: SubCut Heparin  GI PPX: PPI  Code Status: Full Code        Plan Discussed and approved by attending on call.

## 2023-10-29 NOTE — ED PROVIDER NOTE - OBJECTIVE STATEMENT
68-year-old male with PMH of umbilical hernia, HTN, no other PMH presents ED for evaluation of pain to site of umbilical hernia x1 day.  Says he has had this for years preservers been able to reduce it.  Yesterday morning woke up with pain to site, says hernia was firm and unable to be reduced.  Pain increased today so came to ED for evaluation.  Denies nausea/vomiting/diarrhea.  Denies fever/chills, CP, SOB, black or bloody stools, extremity swelling/paresthesias.  Denies prior history of abdominal surgeries.

## 2023-10-29 NOTE — PATIENT PROFILE ADULT - FALL HARM RISK - HARM RISK INTERVENTIONS

## 2023-10-29 NOTE — H&P ADULT - NSHPSOCIALHISTORY_GEN_ALL_CORE
Patient denies smoking, or any illicit drug use  Endorses occasional drinking  Lives w. wife at home, is a

## 2023-10-29 NOTE — H&P ADULT - NSHPPHYSICALEXAM_GEN_ALL_CORE
Vital Signs Last 24 Hrs  T(C): 36.8 (29 Oct 2023 10:08), Max: 36.8 (29 Oct 2023 10:08)  T(F): 98.2 (29 Oct 2023 10:08), Max: 98.2 (29 Oct 2023 10:08)  HR: 78 (29 Oct 2023 10:08) (78 - 85)  BP: 154/85 (29 Oct 2023 10:08) (154/85 - 158/96)  RR: 18 (29 Oct 2023 10:08) (18 - 18)  SpO2: 98% (29 Oct 2023 10:08) (98% - 98%)    Parameters below as of 29 Oct 2023 10:08  Patient On (Oxygen Delivery Method): room air      GENERAL:  69y/o Male NAD, resting comfortably.  HEAD:  Atraumatic, Normocephalic  EYES: EOMI, PERRLA, conjunctiva and sclera clear  NECK: Supple, No JVD, no cervical lymphadenopathy, non-tender  CHEST/LUNG: Clear to auscultation bilaterally; No wheeze, rhonchi, or rales  HEART: Regular rate and rhythm; S1&S2  ABDOMEN: Soft, irreduceable umbilical hernia w/ ttp, around 2-3cm, erythematous superficial skin  EXTREMITIES:   Peripheral Pulses Present, No clubbing, no cyanosis, or no edema, no calf tenderness  PSYCH: AAOx3, cooperative, appropriate

## 2023-10-29 NOTE — ED PROVIDER NOTE - PHYSICAL EXAMINATION
VITAL SIGNS: I have reviewed nursing notes and confirm.  CONSTITUTIONAL: comfortable appearing male, lying on stretcher in NAD  ENT: mmm  CARD: S1, S2 normal; no murmurs, gallops, or rubs. Regular rate and rhythm.  RESP: Normal respiratory effort, no tachypnea or distress. Lungs CTAB, no wheezes, rales or rhonchi.  ABD: soft, ND, (+) tender, firm umbilical hernia, unable to reduce, +overlying erythema.   EXT: Normal ROM. No clubbing, cyanosis or edema.  NEURO: Alert, oriented. Grossly unremarkable. No focal deficits.  PSYCH: Cooperative, appropriate.

## 2023-10-29 NOTE — H&P ADULT - NS ATTEND AMEND GEN_ALL_CORE FT
Pt seen and examined at 1500 hours, wife and PA at bedside.  He is alert and stable, not in acute distress.  Abd soft and not distended, has an incarcerated UH with local erythema and induration, suspicious for strangulation.  Remainder of abd is benign.  Labs and imaging reviewed. Hernia does not contain any bowel.    IMP:  Incarcerated and probably strangulated UH containing part of the Omentum.  Pt to go to or now for exploration and repair with any other indicated procedures.   Details of procedure and its R/B/A/sequelae of no Rx were discussed and all their questions were answered.  Consent obtained and chart.  They are happy with the assessment and plan. Pt seen and examined at 1500 hours, wife and PA at bedside.  He is alert and stable, not in acute distress.   Abd soft and not distended, has an incarcerated UH with local erythema and induration, suspicious for strangulation.  Remainder of abd is benign.  Labs and imaging reviewed. Hernia does not contain any bowel.    IMP:  Incarcerated and probably strangulated UH containing part of the Omentum.  Pt to go to or now for exploration and repair with any other indicated procedures.   Details of procedure and its R/B/A/sequelae of no Rx were discussed and all their questions were answered.  Consent obtained and chart.  They are happy with the assessment and plan.

## 2023-10-29 NOTE — ED PROVIDER NOTE - INPATIENT RESIDENT/ACP NOTIFIED DATE
November 11, 2022      [facility]  [address] [place] [address]  Phone: [phone]  Fax: [phone]       Patient: Mary Ann Baltazar   YOB: 2009  Date of Visit: 11/11/2022    To Whom It May Concern:    Awilda Baltazar  was at [facility] on 11/11/2022. The patient may return to work/school on 11/11/2022 with no restrictions. If you have any questions or concerns, or if I can be of further assistance, please do not hesitate to contact me.    Sincerely,        Megan Knott MD     
29-Oct-2023 10:39

## 2023-10-30 ENCOUNTER — TRANSCRIPTION ENCOUNTER (OUTPATIENT)
Age: 68
End: 2023-10-30

## 2023-10-30 VITALS
SYSTOLIC BLOOD PRESSURE: 137 MMHG | OXYGEN SATURATION: 97 % | DIASTOLIC BLOOD PRESSURE: 70 MMHG | TEMPERATURE: 96 F | HEART RATE: 75 BPM | RESPIRATION RATE: 18 BRPM

## 2023-10-30 LAB
ANION GAP SERPL CALC-SCNC: 13 MMOL/L — SIGNIFICANT CHANGE UP (ref 7–14)
ANION GAP SERPL CALC-SCNC: 13 MMOL/L — SIGNIFICANT CHANGE UP (ref 7–14)
BASOPHILS # BLD AUTO: 0.03 K/UL — SIGNIFICANT CHANGE UP (ref 0–0.2)
BASOPHILS # BLD AUTO: 0.03 K/UL — SIGNIFICANT CHANGE UP (ref 0–0.2)
BASOPHILS NFR BLD AUTO: 0.3 % — SIGNIFICANT CHANGE UP (ref 0–1)
BASOPHILS NFR BLD AUTO: 0.3 % — SIGNIFICANT CHANGE UP (ref 0–1)
BUN SERPL-MCNC: 29 MG/DL — HIGH (ref 10–20)
BUN SERPL-MCNC: 29 MG/DL — HIGH (ref 10–20)
CALCIUM SERPL-MCNC: 8.9 MG/DL — SIGNIFICANT CHANGE UP (ref 8.4–10.5)
CALCIUM SERPL-MCNC: 8.9 MG/DL — SIGNIFICANT CHANGE UP (ref 8.4–10.5)
CHLORIDE SERPL-SCNC: 106 MMOL/L — SIGNIFICANT CHANGE UP (ref 98–110)
CHLORIDE SERPL-SCNC: 106 MMOL/L — SIGNIFICANT CHANGE UP (ref 98–110)
CO2 SERPL-SCNC: 20 MMOL/L — SIGNIFICANT CHANGE UP (ref 17–32)
CO2 SERPL-SCNC: 20 MMOL/L — SIGNIFICANT CHANGE UP (ref 17–32)
CREAT SERPL-MCNC: 2.1 MG/DL — HIGH (ref 0.7–1.5)
CREAT SERPL-MCNC: 2.1 MG/DL — HIGH (ref 0.7–1.5)
EGFR: 34 ML/MIN/1.73M2 — LOW
EGFR: 34 ML/MIN/1.73M2 — LOW
EOSINOPHIL # BLD AUTO: 0 K/UL — SIGNIFICANT CHANGE UP (ref 0–0.7)
EOSINOPHIL # BLD AUTO: 0 K/UL — SIGNIFICANT CHANGE UP (ref 0–0.7)
EOSINOPHIL NFR BLD AUTO: 0 % — SIGNIFICANT CHANGE UP (ref 0–8)
EOSINOPHIL NFR BLD AUTO: 0 % — SIGNIFICANT CHANGE UP (ref 0–8)
GLUCOSE SERPL-MCNC: 152 MG/DL — HIGH (ref 70–99)
GLUCOSE SERPL-MCNC: 152 MG/DL — HIGH (ref 70–99)
HCT VFR BLD CALC: 42.3 % — SIGNIFICANT CHANGE UP (ref 42–52)
HCT VFR BLD CALC: 42.3 % — SIGNIFICANT CHANGE UP (ref 42–52)
HCV AB S/CO SERPL IA: 0.04 COI — SIGNIFICANT CHANGE UP
HCV AB S/CO SERPL IA: 0.04 COI — SIGNIFICANT CHANGE UP
HCV AB SERPL-IMP: SIGNIFICANT CHANGE UP
HCV AB SERPL-IMP: SIGNIFICANT CHANGE UP
HGB BLD-MCNC: 14.5 G/DL — SIGNIFICANT CHANGE UP (ref 14–18)
HGB BLD-MCNC: 14.5 G/DL — SIGNIFICANT CHANGE UP (ref 14–18)
IMM GRANULOCYTES NFR BLD AUTO: 0.6 % — HIGH (ref 0.1–0.3)
IMM GRANULOCYTES NFR BLD AUTO: 0.6 % — HIGH (ref 0.1–0.3)
LYMPHOCYTES # BLD AUTO: 0.92 K/UL — LOW (ref 1.2–3.4)
LYMPHOCYTES # BLD AUTO: 0.92 K/UL — LOW (ref 1.2–3.4)
LYMPHOCYTES # BLD AUTO: 8.8 % — LOW (ref 20.5–51.1)
LYMPHOCYTES # BLD AUTO: 8.8 % — LOW (ref 20.5–51.1)
MAGNESIUM SERPL-MCNC: 1.9 MG/DL — SIGNIFICANT CHANGE UP (ref 1.8–2.4)
MAGNESIUM SERPL-MCNC: 1.9 MG/DL — SIGNIFICANT CHANGE UP (ref 1.8–2.4)
MCHC RBC-ENTMCNC: 28 PG — SIGNIFICANT CHANGE UP (ref 27–31)
MCHC RBC-ENTMCNC: 28 PG — SIGNIFICANT CHANGE UP (ref 27–31)
MCHC RBC-ENTMCNC: 34.3 G/DL — SIGNIFICANT CHANGE UP (ref 32–37)
MCHC RBC-ENTMCNC: 34.3 G/DL — SIGNIFICANT CHANGE UP (ref 32–37)
MCV RBC AUTO: 81.8 FL — SIGNIFICANT CHANGE UP (ref 80–94)
MCV RBC AUTO: 81.8 FL — SIGNIFICANT CHANGE UP (ref 80–94)
MONOCYTES # BLD AUTO: 0.43 K/UL — SIGNIFICANT CHANGE UP (ref 0.1–0.6)
MONOCYTES # BLD AUTO: 0.43 K/UL — SIGNIFICANT CHANGE UP (ref 0.1–0.6)
MONOCYTES NFR BLD AUTO: 4.1 % — SIGNIFICANT CHANGE UP (ref 1.7–9.3)
MONOCYTES NFR BLD AUTO: 4.1 % — SIGNIFICANT CHANGE UP (ref 1.7–9.3)
NEUTROPHILS # BLD AUTO: 8.96 K/UL — HIGH (ref 1.4–6.5)
NEUTROPHILS # BLD AUTO: 8.96 K/UL — HIGH (ref 1.4–6.5)
NEUTROPHILS NFR BLD AUTO: 86.2 % — HIGH (ref 42.2–75.2)
NEUTROPHILS NFR BLD AUTO: 86.2 % — HIGH (ref 42.2–75.2)
NRBC # BLD: 0 /100 WBCS — SIGNIFICANT CHANGE UP (ref 0–0)
NRBC # BLD: 0 /100 WBCS — SIGNIFICANT CHANGE UP (ref 0–0)
PHOSPHATE SERPL-MCNC: 2.4 MG/DL — SIGNIFICANT CHANGE UP (ref 2.1–4.9)
PHOSPHATE SERPL-MCNC: 2.4 MG/DL — SIGNIFICANT CHANGE UP (ref 2.1–4.9)
PLATELET # BLD AUTO: 152 K/UL — SIGNIFICANT CHANGE UP (ref 130–400)
PLATELET # BLD AUTO: 152 K/UL — SIGNIFICANT CHANGE UP (ref 130–400)
PMV BLD: 11.3 FL — HIGH (ref 7.4–10.4)
PMV BLD: 11.3 FL — HIGH (ref 7.4–10.4)
POTASSIUM SERPL-MCNC: 5.1 MMOL/L — HIGH (ref 3.5–5)
POTASSIUM SERPL-MCNC: 5.1 MMOL/L — HIGH (ref 3.5–5)
POTASSIUM SERPL-SCNC: 5.1 MMOL/L — HIGH (ref 3.5–5)
POTASSIUM SERPL-SCNC: 5.1 MMOL/L — HIGH (ref 3.5–5)
RBC # BLD: 5.17 M/UL — SIGNIFICANT CHANGE UP (ref 4.7–6.1)
RBC # BLD: 5.17 M/UL — SIGNIFICANT CHANGE UP (ref 4.7–6.1)
RBC # FLD: 13.5 % — SIGNIFICANT CHANGE UP (ref 11.5–14.5)
RBC # FLD: 13.5 % — SIGNIFICANT CHANGE UP (ref 11.5–14.5)
SODIUM SERPL-SCNC: 139 MMOL/L — SIGNIFICANT CHANGE UP (ref 135–146)
SODIUM SERPL-SCNC: 139 MMOL/L — SIGNIFICANT CHANGE UP (ref 135–146)
WBC # BLD: 10.4 K/UL — SIGNIFICANT CHANGE UP (ref 4.8–10.8)
WBC # BLD: 10.4 K/UL — SIGNIFICANT CHANGE UP (ref 4.8–10.8)
WBC # FLD AUTO: 10.4 K/UL — SIGNIFICANT CHANGE UP (ref 4.8–10.8)
WBC # FLD AUTO: 10.4 K/UL — SIGNIFICANT CHANGE UP (ref 4.8–10.8)

## 2023-10-30 RX ORDER — CHLORHEXIDINE GLUCONATE 213 G/1000ML
1 SOLUTION TOPICAL
Refills: 0 | Status: DISCONTINUED | OUTPATIENT
Start: 2023-10-30 | End: 2023-10-30

## 2023-10-30 RX ADMIN — HEPARIN SODIUM 5000 UNIT(S): 5000 INJECTION INTRAVENOUS; SUBCUTANEOUS at 06:14

## 2023-10-30 NOTE — DISCHARGE NOTE PROVIDER - NSDCMRMEDTOKEN_GEN_ALL_CORE_FT
Follows w Dr. Huston at Rheumatology. On Cellcept and prednisone, endorses intermittent compliance. Was receiving rituximab infusions, but unable to since April d/t COVID and recent admission for PNA.    - Continue holding cellcept  - will complete COPD methylprednisolone course and then resume home prednisone 10mg qd   - 5/19: transitioned back to home prednisone.  - likely contributing to worsening dysphagia, aspiration, WOB  - Rheum consulted for initiation of rituximab while inpatient.    - undergoing workup.  5/20: discussing prognosis. Family meeting    Naomi 5 mg-20 mg oral tablet: 1 tab(s) orally once a day  Flomax 0.4 mg oral capsule: 1 cap(s) orally once a day

## 2023-10-30 NOTE — DISCHARGE NOTE PROVIDER - NSDCCPCAREPLAN_GEN_ALL_CORE_FT
PRINCIPAL DISCHARGE DIAGNOSIS  Diagnosis: Strangulated umbilical hernia  Assessment and Plan of Treatment: s/p repair  no heavy lifting >10lbs  May shower after 24h - no BATHS  Tylenol/Motrin PRN pain  FUP Dr Watson 2 weeks  no driving until cleared by MD       PRINCIPAL DISCHARGE DIAGNOSIS  Diagnosis: Strangulated umbilical hernia  Assessment and Plan of Treatment: s/p repair  no heavy lifting >10lbs  May shower after 24h - no BATHS  Tylenol/Motrin PRN pain  FUP Dr Watson 2 weeks  no driving until cleared by MD  if fever, chills, worsening abdominal pain, please report back to the ER        SECONDARY DISCHARGE DIAGNOSES  Diagnosis: Pancreatic cyst  Assessment and Plan of Treatment: #Pancreatic cystic lesion  - Incidental finding on CT   - CT AP w/ 8 mm cystic lesion is noted within the pancreatic   uncinate process, not definitely identified on prior exam; further   evaluation with contrast-enhanced pancreatic protocol MRI/MRCP is   suggested  - Outpatient FU

## 2023-10-30 NOTE — DISCHARGE NOTE NURSING/CASE MANAGEMENT/SOCIAL WORK - NSDCPEFALRISK_GEN_ALL_CORE
For information on Fall & Injury Prevention, visit: [email or web address] OR  [email or web address] OR  https://www.cdc.gov/steadi/patient.html

## 2023-10-30 NOTE — DISCHARGE NOTE PROVIDER - NSDCFUSCHEDAPPT_GEN_ALL_CORE_FT
Mk Ricks  [facility] Physician [facility]  [address]  Scheduled Appointment: 11/15/2023    Portia Hoang  [facility]  [facility] Doc Off [address]  Scheduled Appointment: 12/22/2023

## 2023-10-30 NOTE — DISCHARGE NOTE PROVIDER - HOSPITAL COURSE
Patient is a 67yo male w/ PMHx of HTN, BPH, recent diagnosis of kidney disease, Umbilical hernia presenting w/ irredufceable umbical hernia. Reports has had this her for 2-3 years and usually is able to reduce it on its own, however yesterday morning her noted the protrusion of the hernia and was unable to reduced depite multiple attempt. Reports since than he noticed the surrounding skin becoming erythematous and worsening pain. Reports the pain in general is 5/10, 9/10 when touching the hernia. Denies fever, chills, n/v, chest pain, sob, changes in BMs, or any urinary sxs.    Pt went to OR and had umbilical hernia repair without any difficulties.    Pt was doing well POD 1 and stable for DC    FUP Dr Watson 2 weeks

## 2023-10-30 NOTE — DISCHARGE NOTE PROVIDER - CARE PROVIDERS DIRECT ADDRESSES
,[email or web address]

## 2023-10-30 NOTE — DISCHARGE NOTE NURSING/CASE MANAGEMENT/SOCIAL WORK - PATIENT PORTAL LINK FT
You can access the FollowMyHealth Patient Portal offered by [facility] by registering at the following website: [email or web address]. By joining [employer]’s FollowMyHealth portal, you will also be able to view your health information using other applications (apps) compatible with our system.

## 2023-10-30 NOTE — DISCHARGE NOTE PROVIDER - PROVIDER TOKENS
PROVIDER:[TOKEN:[[id number]],FOLLOWUP:[2 weeks]] PROVIDER:[TOKEN:[[id number]],FOLLOWUP:[2 weeks]],PROVIDER:[TOKEN:[[id number]],FOLLOWUP:[2 weeks]]

## 2023-10-30 NOTE — DISCHARGE NOTE PROVIDER - CARE PROVIDER_API CALL
Ronnell Watson  Surgery  [address] [place] [id number]  Phone: [phone]  Fax: [phone]  Follow Up Time: 2 weeks   Ronnell Watson  Surgery  [address] [place] [address]  Phone: [phone]  Fax: [phone]  Follow Up Time: 2 weeks    Mj Galeana  Internal Medicine  [address] [place] [id number]  Phone: [phone]  Fax: [phone]  Follow Up Time: 2 weeks

## 2023-10-31 PROBLEM — N40.0 BENIGN PROSTATIC HYPERPLASIA WITHOUT LOWER URINARY TRACT SYMPTOMS: Chronic | Status: ACTIVE | Noted: 2023-10-29

## 2023-11-02 ENCOUNTER — NON-APPOINTMENT (OUTPATIENT)
Age: 68
End: 2023-11-02

## 2023-11-06 LAB
SURGICAL PATHOLOGY STUDY: SIGNIFICANT CHANGE UP
SURGICAL PATHOLOGY STUDY: SIGNIFICANT CHANGE UP

## 2023-11-07 DIAGNOSIS — N28.9 DISORDER OF KIDNEY AND URETER, UNSPECIFIED: ICD-10-CM

## 2023-11-07 DIAGNOSIS — K42.0 UMBILICAL HERNIA WITH OBSTRUCTION, WITHOUT GANGRENE: ICD-10-CM

## 2023-11-07 DIAGNOSIS — K86.2 CYST OF PANCREAS: ICD-10-CM

## 2023-11-07 DIAGNOSIS — N40.0 BENIGN PROSTATIC HYPERPLASIA WITHOUT LOWER URINARY TRACT SYMPTOMS: ICD-10-CM

## 2023-11-07 DIAGNOSIS — I10 ESSENTIAL (PRIMARY) HYPERTENSION: ICD-10-CM

## 2023-11-15 ENCOUNTER — APPOINTMENT (OUTPATIENT)
Dept: ORTHOPEDIC SURGERY | Facility: CLINIC | Age: 68
End: 2023-11-15
Payer: MEDICARE

## 2023-11-15 VITALS — WEIGHT: 212 LBS | BODY MASS INDEX: 31.4 KG/M2 | HEIGHT: 69 IN

## 2023-11-15 DIAGNOSIS — S46.011A STRAIN OF MUSCLE(S) AND TENDON(S) OF THE ROTATOR CUFF OF RIGHT SHOULDER, INITIAL ENCOUNTER: ICD-10-CM

## 2023-11-15 PROCEDURE — 99214 OFFICE O/P EST MOD 30 MIN: CPT

## 2023-11-16 ENCOUNTER — APPOINTMENT (OUTPATIENT)
Dept: SURGERY | Facility: CLINIC | Age: 68
End: 2023-11-16

## 2023-11-16 ENCOUNTER — APPOINTMENT (OUTPATIENT)
Dept: SURGERY | Facility: CLINIC | Age: 68
End: 2023-11-16
Payer: MEDICARE

## 2023-11-16 VITALS
HEIGHT: 69 IN | TEMPERATURE: 97 F | HEART RATE: 78 BPM | SYSTOLIC BLOOD PRESSURE: 138 MMHG | WEIGHT: 212 LBS | OXYGEN SATURATION: 98 % | BODY MASS INDEX: 31.4 KG/M2 | DIASTOLIC BLOOD PRESSURE: 78 MMHG

## 2023-11-16 PROCEDURE — 99024 POSTOP FOLLOW-UP VISIT: CPT

## 2023-11-21 ENCOUNTER — NON-APPOINTMENT (OUTPATIENT)
Age: 68
End: 2023-11-21

## 2023-12-13 ENCOUNTER — APPOINTMENT (OUTPATIENT)
Dept: GASTROENTEROLOGY | Facility: CLINIC | Age: 68
End: 2023-12-13
Payer: MEDICARE

## 2023-12-13 VITALS — BODY MASS INDEX: 32.73 KG/M2 | HEIGHT: 69 IN | WEIGHT: 221 LBS

## 2023-12-13 PROCEDURE — 99204 OFFICE O/P NEW MOD 45 MIN: CPT

## 2023-12-22 NOTE — HISTORY OF PRESENT ILLNESS
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 He was told by his PMD he has bad reflux. He currently is without additional complaints.

## 2023-12-22 NOTE — ASSESSMENT
[FreeTextEntry1] : Patient is a 69 y/o with HTN and BPH whom was referred as he was found to have a 8mm Pancreatic Uncinate process cyst along with ongoing GERD. He was told by his PMD he has bad reflux. He currently is without additional complaints.   GERD/ Pancreatic Cyst 8mm Uncinate process - He is not on AC/AP - Setup for EGD with EUS

## 2024-01-04 ENCOUNTER — TRANSCRIPTION ENCOUNTER (OUTPATIENT)
Age: 69
End: 2024-01-04

## 2024-01-04 ENCOUNTER — OUTPATIENT (OUTPATIENT)
Dept: OUTPATIENT SERVICES | Facility: HOSPITAL | Age: 69
LOS: 1 days | Discharge: HOME | End: 2024-01-04
Payer: MEDICARE

## 2024-01-04 ENCOUNTER — RESULT REVIEW (OUTPATIENT)
Age: 69
End: 2024-01-04

## 2024-01-04 VITALS
DIASTOLIC BLOOD PRESSURE: 91 MMHG | OXYGEN SATURATION: 96 % | HEART RATE: 70 BPM | RESPIRATION RATE: 18 BRPM | SYSTOLIC BLOOD PRESSURE: 144 MMHG

## 2024-01-04 VITALS
DIASTOLIC BLOOD PRESSURE: 96 MMHG | RESPIRATION RATE: 18 BRPM | TEMPERATURE: 98 F | SYSTOLIC BLOOD PRESSURE: 176 MMHG | WEIGHT: 229.06 LBS | HEIGHT: 69 IN | HEART RATE: 79 BPM

## 2024-01-04 DIAGNOSIS — K21.9 GASTRO-ESOPHAGEAL REFLUX DISEASE WITHOUT ESOPHAGITIS: ICD-10-CM

## 2024-01-04 DIAGNOSIS — K86.2 CYST OF PANCREAS: ICD-10-CM

## 2024-01-04 DIAGNOSIS — Z98.890 OTHER SPECIFIED POSTPROCEDURAL STATES: Chronic | ICD-10-CM

## 2024-01-04 PROCEDURE — 43239 EGD BIOPSY SINGLE/MULTIPLE: CPT

## 2024-01-04 PROCEDURE — 88305 TISSUE EXAM BY PATHOLOGIST: CPT | Mod: 26

## 2024-01-04 PROCEDURE — 88305 TISSUE EXAM BY PATHOLOGIST: CPT

## 2024-01-04 PROCEDURE — 43237 ENDOSCOPIC US EXAM ESOPH: CPT

## 2024-01-04 PROCEDURE — 88312 SPECIAL STAINS GROUP 1: CPT

## 2024-01-04 PROCEDURE — 88312 SPECIAL STAINS GROUP 1: CPT | Mod: 26

## 2024-01-04 RX ORDER — TAMSULOSIN HYDROCHLORIDE 0.4 MG/1
1 CAPSULE ORAL
Refills: 0 | DISCHARGE

## 2024-01-04 RX ORDER — AMLODIPINE BESYLATE AND OLMESARTRAN MEDOXOMIL 10; 40 MG/1; MG/1
1 TABLET, FILM COATED ORAL
Refills: 0 | DISCHARGE

## 2024-01-04 NOTE — ASU PATIENT PROFILE, ADULT - FALL HARM RISK - UNIVERSAL INTERVENTIONS
Bed in lowest position, wheels locked, appropriate side rails in place/Call bell, personal items and telephone in reach/Instruct patient to call for assistance before getting out of bed or chair/Non-slip footwear when patient is out of bed/[place] to call system/Physically safe environment - no spills, clutter or unnecessary equipment/Purposeful Proactive Rounding/Room/bathroom lighting operational, light cord in reach Bed in lowest position, wheels locked, appropriate side rails in place/Call bell, personal items and telephone in reach/Instruct patient to call for assistance before getting out of bed or chair/Non-slip footwear when patient is out of bed/[place] to call system/Physically safe environment - no spills, clutter or unnecessary equipment/Purposeful Proactive Rounding/Room/bathroom lighting operational, light cord in reach

## 2024-01-04 NOTE — ASU DISCHARGE PLAN (ADULT/PEDIATRIC) - NS MD DC FALL RISK RISK
For information on Fall & Injury Prevention, visit: [email or web address]/[email or web address]-[email or web address] OR  [email or web address]://[email or web address].[facility]/[email or web address]-[email or web address]-injury OR  https://www.cdc.gov/steadi/patient.html For information on Fall & Injury Prevention, visit: https://www.[facility]/news/fall-prevention-protects-[email or web address]-maintains-health-[email or web address]-mobility OR  https://www.[facility]/news/fall-prevention-tips-to-avoid-injury OR  https://www.cdc.gov/steadi/patient.html

## 2024-01-08 LAB
SURGICAL PATHOLOGY STUDY: SIGNIFICANT CHANGE UP
SURGICAL PATHOLOGY STUDY: SIGNIFICANT CHANGE UP

## 2024-01-09 DIAGNOSIS — K44.9 DIAPHRAGMATIC HERNIA WITHOUT OBSTRUCTION OR GANGRENE: ICD-10-CM

## 2024-01-09 DIAGNOSIS — K21.9 GASTRO-ESOPHAGEAL REFLUX DISEASE WITHOUT ESOPHAGITIS: ICD-10-CM

## 2024-01-09 DIAGNOSIS — K86.2 CYST OF PANCREAS: ICD-10-CM

## 2024-01-09 DIAGNOSIS — K22.2 ESOPHAGEAL OBSTRUCTION: ICD-10-CM

## 2024-01-09 DIAGNOSIS — K21.00 GASTRO-ESOPHAGEAL REFLUX DISEASE WITH ESOPHAGITIS, WITHOUT BLEEDING: ICD-10-CM

## 2024-01-09 DIAGNOSIS — K29.50 UNSPECIFIED CHRONIC GASTRITIS WITHOUT BLEEDING: ICD-10-CM

## 2024-01-09 DIAGNOSIS — I10 ESSENTIAL (PRIMARY) HYPERTENSION: ICD-10-CM

## 2024-01-24 ENCOUNTER — APPOINTMENT (OUTPATIENT)
Dept: GASTROENTEROLOGY | Facility: CLINIC | Age: 69
End: 2024-01-24
Payer: MEDICARE

## 2024-01-24 DIAGNOSIS — Z78.9 OTHER SPECIFIED HEALTH STATUS: ICD-10-CM

## 2024-01-24 DIAGNOSIS — K22.2 ESOPHAGEAL OBSTRUCTION: ICD-10-CM

## 2024-01-24 PROCEDURE — 99443: CPT

## 2024-01-24 RX ORDER — TAMSULOSIN HCL 0.4 MG
0.4 CAPSULE ORAL
Refills: 0 | Status: ACTIVE | COMMUNITY

## 2024-01-24 RX ORDER — AMLODIPINE BESYLATE AND OLMESARTAN MEDOXOMIL 10; 40 MG/1; MG/1
TABLET, FILM COATED ORAL
Refills: 0 | Status: ACTIVE | COMMUNITY

## 2024-01-29 NOTE — HISTORY OF PRESENT ILLNESS
[FreeTextEntry1] : Patient is a 69 y/o with HTN and BPH whom was referred as he was found to have a 8mm Pancreatic Uncinate process cyst along with ongoing GERD is S/P EGD/EUS for F/U today. EGD showed erosive esophagitis with Schiatzki's ring and small hiatal hernia. EUS showed a 12 x 9 mm anechoic cyst with no high risk features suggestive of IPMN, no evidence of chronic pancreatitis, CBD was 4-5 mm without evidence of stones. EGD biopsies were negative for HP, IM, and dysplasia. He C/O persistent cough x many years. He saw Dr. Gallegos and a pulmonary etiology was ruled out. He had tried omeprazole in the past without improvement. He denied heartburn, dysphagia, choking, vomiting, weight loss, SOB, or other complaints.  [de-identified] : 1/4/24 [de-identified] : 1/4/24

## 2024-01-29 NOTE — REASON FOR VISIT
[Home] : at home, [unfilled] , at the time of the visit. [Medical Office: ([facility])___] : at the medical office located in  [Verbal consent obtained from patient] : the patient, [unfilled] [Post-op/Procedure: _________] : a [unfilled] post-op/procedure visit [FreeTextEntry4] : Amandeep

## 2024-02-15 ENCOUNTER — APPOINTMENT (OUTPATIENT)
Dept: SURGERY | Facility: CLINIC | Age: 69
End: 2024-02-15
Payer: MEDICARE

## 2024-02-15 VITALS
TEMPERATURE: 97 F | SYSTOLIC BLOOD PRESSURE: 130 MMHG | DIASTOLIC BLOOD PRESSURE: 88 MMHG | BODY MASS INDEX: 32.73 KG/M2 | WEIGHT: 221 LBS | OXYGEN SATURATION: 98 % | HEIGHT: 69 IN | HEART RATE: 81 BPM

## 2024-02-15 PROCEDURE — 99212 OFFICE O/P EST SF 10 MIN: CPT

## 2024-02-16 NOTE — HISTORY OF PRESENT ILLNESS
[de-identified] : The patient comes for his final postop check after his recent umbilical hernia repair in October.  He looks and feels well, and has no complaints regarding the surgery, except for a tender spot on his incision which is occasionally painful and catches on his clothing.

## 2024-02-16 NOTE — ASSESSMENT
[FreeTextEntry1] : Repair intact and no postop problems noted.  There is a prominent Prolene suture which is somewhat symptomatic and can be drained or removed in the office under local anesthesia at his convenience.  This procedure was discussed and he understands and agrees.  He will contact the office for scheduling when ready to proceed.

## 2024-02-16 NOTE — PHYSICAL EXAM
[Abdominal Masses] : No abdominal masses [Abdomen Tenderness] : ~T ~M No abdominal tenderness [No HSM] : no hepatosplenomegaly [de-identified] : Healthy [de-identified] : Curvilinear incision along the lower margin of the umbilicus has healed without any problems.  The repair is intact both supine and standing with coughing and straining, and no other hernias are noted.  The area he complains about is a prominent Prolene suture which is protruding somewhat but not ulcerating skin.

## 2024-04-11 ENCOUNTER — APPOINTMENT (OUTPATIENT)
Dept: SURGERY | Facility: CLINIC | Age: 69
End: 2024-04-11
Payer: MEDICARE

## 2024-04-11 VITALS
DIASTOLIC BLOOD PRESSURE: 80 MMHG | HEIGHT: 69 IN | WEIGHT: 226 LBS | SYSTOLIC BLOOD PRESSURE: 126 MMHG | BODY MASS INDEX: 33.47 KG/M2

## 2024-04-11 DIAGNOSIS — K42.9 UMBILICAL HERNIA W/OUT OBSTRUCTION OR GANGRENE: ICD-10-CM

## 2024-04-11 PROCEDURE — 99024 POSTOP FOLLOW-UP VISIT: CPT

## 2024-04-14 PROBLEM — K42.9 UMBILICAL HERNIA: Status: ACTIVE | Noted: 2023-11-16

## 2024-04-14 NOTE — PHYSICAL EXAM
[de-identified] : Healthy [de-identified] : Curvilinear incision along the lower margin of the umbilicus has healed without any problems. The repair is intact both supine and standing with coughing and straining, and no other hernias are noted. Prominent Prolene suture noted which is protruding but not ulcerating skin.

## 2024-04-14 NOTE — HISTORY OF PRESENT ILLNESS
[de-identified] : The patient returns to the office complaining of a prominent suture that is bothersome to him after his umbilical hernia repair in October 2023. It is occasionally tender and catches on his clothing. He otherwise looks and feels well.

## 2024-04-14 NOTE — ASSESSMENT
[FreeTextEntry1] : Mr. Leonardo Strauss is a 69-year-old male who returns to the office complaining of a prominent suture that is bothersome to him after his umbilical hernia repair in October 2023. This can safely be drained or removed in the office under local anesthesia. The procedure was discussed in full and he understands and agrees. A witnessed surgical consent was obtained. He will schedule the procedure at his earliest convenience. He will return to the office 10-14 days post-operatively, or sooner as needed. He is happy with the assessment and plan. All of his questions were answered.

## 2024-04-17 ENCOUNTER — APPOINTMENT (OUTPATIENT)
Dept: SURGERY | Facility: CLINIC | Age: 69
End: 2024-04-17
Payer: MEDICARE

## 2024-04-17 DIAGNOSIS — T81.30XA DISRUPTION OF WOUND, UNSPECIFIED, INITIAL ENCOUNTER: ICD-10-CM

## 2024-04-17 PROCEDURE — 10120 INC&RMVL FB SUBQ TISS SMPL: CPT

## 2024-04-17 NOTE — PROCEDURE
[FreeTextEntry1] : The patient returns for excision of the bothersome suture at the site of his prior umbilical hernia repair.  He was positioned appropriately on the procedure table and the area was prepped and draped in sterile fashion.  The umbilical region was infiltrated with 3 to 4 cc of 1% plain Xylocaine.  A small transverse incision was made directly over the palpable suture which was exposed, dissected free, and excised completely.  There was no evidence of infection and the underlying fascia appeared intact.  The wound was irrigated and checked for hemostasis, and the incision was closed in layers with interrupted inverting subcutaneous suture of 3-0 Vicryl, and a running subcuticular suture of 4-0 Monocryl.  Steri-Strips and a dry sterile occlusive dressing were applied.  The patient tolerated the procedure well and left the office in good condition.  He was given full wound care and activity instructions and will use OTC analgesics as needed.  The case is clean and the blood loss was 2 cc.  The incision was 2 cm in length and the suture was discarded.

## 2024-04-23 PROBLEM — Z78.9 CAFFEINE USE: Status: ACTIVE | Noted: 2024-01-23

## 2024-04-23 PROBLEM — Z78.9 NON-SMOKER: Status: ACTIVE | Noted: 2023-12-13

## 2024-04-23 PROBLEM — Z78.9 SOCIAL ALCOHOL USE: Status: ACTIVE | Noted: 2024-01-23

## 2024-04-23 PROBLEM — R05.9 COUGH: Status: ACTIVE | Noted: 2024-01-24

## 2024-04-23 RX ORDER — PANTOPRAZOLE 40 MG/1
40 TABLET, DELAYED RELEASE ORAL TWICE DAILY
Qty: 60 | Refills: 3 | Status: DISCONTINUED | COMMUNITY
Start: 2024-01-25 | End: 2024-04-23

## 2024-04-24 ENCOUNTER — APPOINTMENT (OUTPATIENT)
Dept: GASTROENTEROLOGY | Facility: CLINIC | Age: 69
End: 2024-04-24

## 2024-04-24 DIAGNOSIS — Z78.9 OTHER SPECIFIED HEALTH STATUS: ICD-10-CM

## 2024-04-24 DIAGNOSIS — R05.9 COUGH, UNSPECIFIED: ICD-10-CM

## 2024-04-24 PROCEDURE — XXXXX: CPT | Mod: 1L

## 2024-05-01 ENCOUNTER — APPOINTMENT (OUTPATIENT)
Dept: GASTROENTEROLOGY | Facility: CLINIC | Age: 69
End: 2024-05-01
Payer: MEDICARE

## 2024-05-01 DIAGNOSIS — K21.9 GASTRO-ESOPHAGEAL REFLUX DISEASE W/OUT ESOPHAGITIS: ICD-10-CM

## 2024-05-01 DIAGNOSIS — K86.2 CYST OF PANCREAS: ICD-10-CM

## 2024-05-01 PROCEDURE — 99442: CPT

## 2024-05-01 RX ORDER — PANTOPRAZOLE 40 MG/1
40 TABLET, DELAYED RELEASE ORAL TWICE DAILY
Qty: 2 | Refills: 1 | Status: ACTIVE | COMMUNITY
Start: 2024-05-01

## 2024-05-02 NOTE — ASSESSMENT
[FreeTextEntry1] : Patient is a 68 y/o with HTN and BPH whom was referred as he was found to have a 8mm Pancreatic Uncinate process cyst along with ongoing GERD is S/P EGD/EUS for F/U today. EGD showed erosive esophagitis with Schiatzki's ring and small hiatal hernia. EUS showed a 12 x 9 mm anechoic cyst with no high risk features suggestive of IPMN, no evidence of chronic pancreatitis, CBD was 4-5 mm without evidence of stones. EGD biopsies were negative for HP, IM, and dysplasia. He C/O persistent cough x many years. He saw Dr. Gallegos and a pulmonary etiology was ruled out. He has been taking pantoprazole for the past 2 months but only OD. He still C/O persistent cough without heartburn, dysphagia, abdominal pain, vomiting, SOB, chest pain, or weight loss. He denied NSAID use, smoking, alcohol use and has been following a GERD diet. He also denied taking meds such as ACE inhibitors that can cause a cough.    Cough, most likely due to GERD Erosive esophagitis - Pt told to increase the pantoprazole to bid x 2 months - GERD diet and GERD measures recommended - F/U in 2 months  Pancreatic cyst, likely IPMN - EUS without high risk features - Repeat MRI or EUS in 1 year for surveillance .

## 2024-05-02 NOTE — HISTORY OF PRESENT ILLNESS
[FreeTextEntry1] : Patient is a 70 y/o with HTN and BPH whom was referred as he was found to have a 8mm Pancreatic Uncinate process cyst along with ongoing GERD is S/P EGD/EUS for F/U today. EGD showed erosive esophagitis with Schiatzki's ring and small hiatal hernia. EUS showed a 12 x 9 mm anechoic cyst with no high risk features suggestive of IPMN, no evidence of chronic pancreatitis, CBD was 4-5 mm without evidence of stones. EGD biopsies were negative for HP, IM, and dysplasia. He C/O persistent cough x many years. He saw Dr. Gallegos and a pulmonary etiology was ruled out. He has been taking pantoprazole for the past 2 months but only OD. He still C/O persistent cough without heartburn, dysphagia, abdominal pain, vomiting, SOB, chest pain, or weight loss. He denied NSAID use, smoking, alcohol use and has been following a GERD diet. He also denied taking meds such as ACE inhibitors that can cause a cough.  [de-identified] : 1/4/24 [de-identified] : 1/4/24

## 2024-05-06 ENCOUNTER — APPOINTMENT (OUTPATIENT)
Dept: SURGERY | Facility: CLINIC | Age: 69
End: 2024-05-06

## 2024-05-15 ENCOUNTER — EMERGENCY (EMERGENCY)
Facility: HOSPITAL | Age: 69
LOS: 0 days | Discharge: HOME | End: 2024-05-15
Attending: EMERGENCY MEDICINE
Payer: MEDICARE

## 2024-05-15 VITALS
TEMPERATURE: 98 F | OXYGEN SATURATION: 100 % | WEIGHT: 216.93 LBS | DIASTOLIC BLOOD PRESSURE: 90 MMHG | HEIGHT: 69 IN | SYSTOLIC BLOOD PRESSURE: 155 MMHG | HEART RATE: 67 BPM | RESPIRATION RATE: 20 BRPM
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
## 2024-05-15 DIAGNOSIS — M25.561 PAIN IN RIGHT KNEE: ICD-10-CM

## 2024-05-15 DIAGNOSIS — I10 ESSENTIAL (PRIMARY) HYPERTENSION: ICD-10-CM

## 2024-05-15 DIAGNOSIS — Z98.890 OTHER SPECIFIED POSTPROCEDURAL STATES: Chronic | ICD-10-CM
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
## 2024-05-15 PROCEDURE — 99284 EMERGENCY DEPT VISIT MOD MDM: CPT

## 2024-05-15 PROCEDURE — 99284 EMERGENCY DEPT VISIT MOD MDM: CPT | Mod: 25

## 2024-05-15 PROCEDURE — 93971 EXTREMITY STUDY: CPT | Mod: 26,RT

## 2024-05-15 PROCEDURE — 73564 X-RAY EXAM KNEE 4 OR MORE: CPT | Mod: 26,RT

## 2024-05-15 PROCEDURE — 93971 EXTREMITY STUDY: CPT | Mod: RT

## 2024-05-15 PROCEDURE — 73564 X-RAY EXAM KNEE 4 OR MORE: CPT | Mod: RT

## 2024-05-15 NOTE — ED PROVIDER NOTE - PATIENT PORTAL LINK FT
You can access the FollowMyHealth Patient Portal offered by [facility] by registering at the following website: [email or web address]. By joining [email or web address]’s FollowMyHealth portal, you will also be able to view your health information using other applications (apps) compatible with our system.

## 2024-05-15 NOTE — ED PROVIDER NOTE - NSFOLLOWUPINSTRUCTIONS_ED_ALL_ED_FT
FOLLOW UP WITH YOUR PRIMARY DOCTOR  RETURN TO ED FOR NEW OR WORSENING SYMPTOMS    Acute Knee Pain, Adult  Acute knee pain is sudden and may be caused by damage, swelling, or irritation of the muscles and tissues that support the knee. Pain may result from:  A fall.  An injury to the knee from twisting motions.  A hit to the knee.  Infection.  Acute knee pain may go away on its own with time and rest. If it does not, your health care provider may order tests to find the cause of the pain. These may include:  Imaging tests, such as an X-ray, MRI, CT scan, or ultrasound.  Joint aspiration. In this test, fluid is removed from the knee and evaluated.  Arthroscopy. In this test, a lighted tube is inserted into the knee and an image is projected onto a TV screen.  Biopsy. In this test, a sample of tissue is removed from the body and studied under a microscope.  Follow these instructions at home:  If you have a knee sleeve or brace:      Wear the knee sleeve or brace as told by your health care provider. Remove it only as told by your health care provider.  Loosen it if your toes tingle, become numb, or turn cold and blue.  Keep it clean.  If the knee sleeve or brace is not waterproof:  Do not let it get wet.  Cover it with a watertight covering when you take a bath or shower.  Activity    Rest your knee.  Do not do things that cause pain or make pain worse.  Avoid high-impact activities or exercises, such as running, jumping rope, or doing jumping jacks.  Work with a physical therapist to make a safe exercise program, as recommended by your health care provider. Do exercises as told by your physical therapist.  Managing pain, stiffness, and swelling      If directed, put ice on the affected knee. To do this:  If you have a removable knee sleeve or brace, remove it as told by your health care provider.  Put ice in a plastic bag.  Place a towel between your skin and the bag.  Leave the ice on for 20 minutes, 2–3 times a day.  Remove the ice if your skin turns bright red. This is very important. If you cannot feel pain, heat, or cold, you have a greater risk of damage to the area.  If directed, use an elastic bandage to put pressure (compression) on your injured knee. This may control swelling, give support, and help with discomfort.  Raise (elevate) your knee above the level of your heart while you are sitting or lying down.  Sleep with a pillow under your knee.  General instructions    Take over-the-counter and prescription medicines only as told by your health care provider.  Do not use any products that contain nicotine or tobacco, such as cigarettes, e-cigarettes, and chewing tobacco. If you need help quitting, ask your health care provider.  If you are overweight, work with your health care provider and a dietitian to set a weight-loss goal that is healthy and reasonable for you. Extra weight can put pressure on your knee.  Pay attention to any changes in your symptoms.  Keep all follow-up visits. This is important.  Contact a health care provider if:  Your knee pain continues, changes, or gets worse.  You have a fever along with knee pain.  Your knee feels warm to the touch or is red.  Your knee jourdan or locks up.  Get help right away if:  Your knee swells, and the swelling becomes worse.  You cannot move your knee.  You have severe pain in your knee that cannot be managed with pain medicine.  Summary  Acute knee pain can be caused by a fall, an injury, an infection, or damage, swelling, or irritation of the tissues that support your knee.  Your health care provider may perform tests to find out the cause of the pain.  Pay attention to any changes in your symptoms. Relieve your pain with rest, medicines, light activity, and the use of ice.  Get help right away if your knee swells, you cannot move your knee, or you have severe pain that cannot be managed with medicine.  This information is not intended to replace advice given to you by your health care provider. Make sure you discuss any questions you have with your health care provider. FOLLOW UP WITH YOUR PRIMARY DOCTOR  RETURN TO ED FOR NEW OR WORSENING SYMPTOMS    Our Emergency Department Referral Coordinators will be reaching out to you in the next 24-48 hours from 9:00am to 5:00pm to schedule a follow up appointment. Please expect a phone call from the hospital in that time frame. If you do not receive a call or if you have any questions or concerns, you can reach them at   [phone].    Acute Knee Pain, Adult  Acute knee pain is sudden and may be caused by damage, swelling, or irritation of the muscles and tissues that support the knee. Pain may result from:  A fall.  An injury to the knee from twisting motions.  A hit to the knee.  Infection.  Acute knee pain may go away on its own with time and rest. If it does not, your health care provider may order tests to find the cause of the pain. These may include:  Imaging tests, such as an X-ray, MRI, CT scan, or ultrasound.  Joint aspiration. In this test, fluid is removed from the knee and evaluated.  Arthroscopy. In this test, a lighted tube is inserted into the knee and an image is projected onto a TV screen.  Biopsy. In this test, a sample of tissue is removed from the body and studied under a microscope.  Follow these instructions at home:  If you have a knee sleeve or brace:      Wear the knee sleeve or brace as told by your health care provider. Remove it only as told by your health care provider.  Loosen it if your toes tingle, become numb, or turn cold and blue.  Keep it clean.  If the knee sleeve or brace is not waterproof:  Do not let it get wet.  Cover it with a watertight covering when you take a bath or shower.  Activity    Rest your knee.  Do not do things that cause pain or make pain worse.  Avoid high-impact activities or exercises, such as running, jumping rope, or doing jumping jacks.  Work with a physical therapist to make a safe exercise program, as recommended by your health care provider. Do exercises as told by your physical therapist.  Managing pain, stiffness, and swelling      If directed, put ice on the affected knee. To do this:  If you have a removable knee sleeve or brace, remove it as told by your health care provider.  Put ice in a plastic bag.  Place a towel between your skin and the bag.  Leave the ice on for 20 minutes, 2–3 times a day.  Remove the ice if your skin turns bright red. This is very important. If you cannot feel pain, heat, or cold, you have a greater risk of damage to the area.  If directed, use an elastic bandage to put pressure (compression) on your injured knee. This may control swelling, give support, and help with discomfort.  Raise (elevate) your knee above the level of your heart while you are sitting or lying down.  Sleep with a pillow under your knee.  General instructions    Take over-the-counter and prescription medicines only as told by your health care provider.  Do not use any products that contain nicotine or tobacco, such as cigarettes, e-cigarettes, and chewing tobacco. If you need help quitting, ask your health care provider.  If you are overweight, work with your health care provider and a dietitian to set a weight-loss goal that is healthy and reasonable for you. Extra weight can put pressure on your knee.  Pay attention to any changes in your symptoms.  Keep all follow-up visits. This is important.  Contact a health care provider if:  Your knee pain continues, changes, or gets worse.  You have a fever along with knee pain.  Your knee feels warm to the touch or is red.  Your knee jourdan or locks up.  Get help right away if:  Your knee swells, and the swelling becomes worse.  You cannot move your knee.  You have severe pain in your knee that cannot be managed with pain medicine.  Summary  Acute knee pain can be caused by a fall, an injury, an infection, or damage, swelling, or irritation of the tissues that support your knee.  Your health care provider may perform tests to find out the cause of the pain.  Pay attention to any changes in your symptoms. Relieve your pain with rest, medicines, light activity, and the use of ice.  Get help right away if your knee swells, you cannot move your knee, or you have severe pain that cannot be managed with medicine.  This information is not intended to replace advice given to you by your health care provider. Make sure you discuss any questions you have with your health care provider.

## 2024-05-15 NOTE — ED PROVIDER NOTE - CARE PROVIDER_API CALL
follow up with your primary doctor,   Phone: (   )    -  Fax: (   )    -  Follow Up Time: 1-3 Days    Mk Ricks  Orthopaedic Surgery  [address] [place] [address]  Phone: [phone]  Fax: [phone]  Follow Up Time: 1-3 Days

## 2024-05-15 NOTE — ED PROVIDER NOTE - ATTENDING APP SHARED VISIT CONTRIBUTION OF CARE
69yM HTN R knee pain after trying to jump up onto his bed and feeling a pop/pain behind his R knee, worse w/ trying to bear weight on his RLE.  No head injury or LOC.

## 2024-05-15 NOTE — ED PROVIDER NOTE - PROVIDER TOKENS
FREE:[LAST:[follow up with your primary doctor],PHONE:[(   )    -],FAX:[(   )    -],FOLLOWUP:[1-3 Days]],PROVIDER:[TOKEN:[[id number]],FOLLOWUP:[1-3 Days]]

## 2024-05-15 NOTE — ED ADULT TRIAGE NOTE - BMI (KG/M2)
[facility] Visit Note    Pt arrived to [facility] ambulatory in no acute distress at 0900 for Renflexis.  Assessment unremarkable.  #24g PIV started in left AC, positive blood return noted.  Patient denies any recent infection or antibiotic use.    Patient denied having any symptoms of COVID-19, i.e. SOB, coughing, fever, or generally not feeling well.      /78   Pulse 52   Temp 97.4 °F (36.3 °C) (Temporal)   Resp 16   Ht 1.702 m (5' 7\")   Wt 66.3 kg (146 lb 3.2 oz)   SpO2 98%   BMI 22.90 kg/m²       The following medications administered:  Medications Administered         0.9 % sodium chloride infusion Admin Date  05/13/2024 Action  New Bag Dose  25 mL/hr Rate  25 mL/hr Route  IntraVENous Administered By  Mariana Suarez, STEPHANIE        inFLIXimab-abda (RENFLEXIS) 322 mg in sodium chloride 0.9 % 275 mL IVPB Admin Date  05/13/2024 Action  New Bag Dose  322 mg Rate  275 mL/hr Route  IntraVENous Administered By  Mariana Suarez, RN        sodium chloride flush 0.9 % injection 5-40 mL Admin Date  05/13/2024 Action  Given Dose  10 mL Rate   Route  IntraVENous Administered By  Mariana Suarez, STEPHANIE             Pt tolerated treatment well.  No adverse reaction noted.  PIV flushed per policy and needle removed, 2x2 and coban placed.  Pt discharged ambulatory in no acute distress at 1030, accompanied by self.  Next appointment 7/8/24.  
32

## 2024-05-15 NOTE — ED ADULT NURSE NOTE - NSFALLUNIVINTERV_ED_ALL_ED
Bed/Stretcher in lowest position, wheels locked, appropriate side rails in place/Call bell, personal items and telephone in reach/Instruct patient to call for assistance before getting out of bed/chair/stretcher/Non-slip footwear applied when patient is off stretcher/[place] to call system/Physically safe environment - no spills, clutter or unnecessary equipment/Purposeful proactive rounding/Room/bathroom lighting operational, light cord in reach

## 2024-05-15 NOTE — ED PROVIDER NOTE - PHYSICAL EXAMINATION
CONST: Well appearing in NAD  CARD: Normal S1 S2; Normal rate and rhythm  RESP: Equal BS B/L, No wheezes, rhonchi or rales. No distress  MS: Normal ROM in all extremities. No midline spinal tenderness.  SKIN: Warm, dry, no acute rashes. Good turgor  NEURO: A&Ox3, No focal deficits. Strength 5/5 with no sensory deficits. Steady gait

## 2024-05-15 NOTE — ED PROVIDER NOTE - OBJECTIVE STATEMENT
patient is a 68yo male PMH htn complaining of right knee pain after jumping onto bed x1hr PTA. pt reports when he jumped on to bed he felt pain behind right knee mid-air, pain increased when he landed on bed. pain worse with weight bearing, reports FROM. denies numbness/ paresthesias, calf pain/ swelling, rash, head trauma, LOC.

## 2024-05-15 NOTE — ED PROVIDER NOTE - CLINICAL SUMMARY MEDICAL DECISION MAKING FREE TEXT BOX
69yM p/w R knee pain after jumping up to his bed.  Pt well appearing and RLE NVI.  Xray w/o fx or dislocation.  US w/o DVT.  Pt placed in knee immobilizer and recommend o/p ortho f/u, return precautions.

## 2024-05-22 ENCOUNTER — APPOINTMENT (OUTPATIENT)
Dept: ORTHOPEDIC SURGERY | Facility: CLINIC | Age: 69
End: 2024-05-22

## 2024-06-03 ENCOUNTER — EMERGENCY (EMERGENCY)
Facility: HOSPITAL | Age: 69
LOS: 0 days | Discharge: HOME | End: 2024-06-03
Attending: EMERGENCY MEDICINE
Payer: MEDICARE

## 2024-06-03 VITALS
DIASTOLIC BLOOD PRESSURE: 94 MMHG | HEART RATE: 73 BPM | RESPIRATION RATE: 18 BRPM | SYSTOLIC BLOOD PRESSURE: 167 MMHG | HEIGHT: 69 IN | OXYGEN SATURATION: 99 % | TEMPERATURE: 99 F

## 2024-06-03 DIAGNOSIS — Z98.890 OTHER SPECIFIED POSTPROCEDURAL STATES: Chronic | ICD-10-CM

## 2024-06-03 DIAGNOSIS — S91.331D PUNCTURE WOUND WITHOUT FOREIGN BODY, RIGHT FOOT, SUBSEQUENT ENCOUNTER: ICD-10-CM

## 2024-06-03 DIAGNOSIS — Z23 ENCOUNTER FOR IMMUNIZATION: ICD-10-CM

## 2024-06-03 PROCEDURE — 99283 EMERGENCY DEPT VISIT LOW MDM: CPT | Mod: 25

## 2024-06-03 PROCEDURE — 73630 X-RAY EXAM OF FOOT: CPT | Mod: 50

## 2024-06-03 PROCEDURE — 73630 X-RAY EXAM OF FOOT: CPT | Mod: 26,50

## 2024-06-03 PROCEDURE — 90715 TDAP VACCINE 7 YRS/> IM: CPT

## 2024-06-03 PROCEDURE — 99284 EMERGENCY DEPT VISIT MOD MDM: CPT

## 2024-06-03 PROCEDURE — 90471 IMMUNIZATION ADMIN: CPT

## 2024-06-03 RX ORDER — CIPROFLOXACIN LACTATE 400MG/40ML
500 VIAL (ML) INTRAVENOUS ONCE
Refills: 0 | Status: COMPLETED | OUTPATIENT
Start: 2024-06-03 | End: 2024-06-03

## 2024-06-03 RX ORDER — CIPROFLOXACIN LACTATE 400MG/40ML
1 VIAL (ML) INTRAVENOUS
Qty: 14 | Refills: 0
Start: 2024-06-03 | End: 2024-06-09

## 2024-06-03 RX ORDER — TETANUS TOXOID, REDUCED DIPHTHERIA TOXOID AND ACELLULAR PERTUSSIS VACCINE, ADSORBED 5; 2.5; 8; 8; 2.5 [IU]/.5ML; [IU]/.5ML; UG/.5ML; UG/.5ML; UG/.5ML
0.5 SUSPENSION INTRAMUSCULAR ONCE
Refills: 0 | Status: COMPLETED | OUTPATIENT
Start: 2024-06-03 | End: 2024-06-03

## 2024-06-03 RX ADMIN — Medication 500 MILLIGRAM(S): at 19:04

## 2024-06-03 RX ADMIN — TETANUS TOXOID, REDUCED DIPHTHERIA TOXOID AND ACELLULAR PERTUSSIS VACCINE, ADSORBED 0.5 MILLILITER(S): 5; 2.5; 8; 8; 2.5 SUSPENSION INTRAMUSCULAR at 19:06

## 2024-06-03 NOTE — ED PROVIDER NOTE - ATTENDING APP SHARED VISIT CONTRIBUTION OF CARE
I have personally performed a history and physical exam on this patient and personally directed the management of the patient. Patient is a 69-year-old male presents for evaluation and wound check as he stepped on a right exposed nail with sandals moderate throbbing in nature impacting the heel of the foot    On physical exam patient has minute puncture wound right foot in the calcaneal region bleeding controlled no signs of infection radial pulse 2+ capillary refills on the    We obtained x-rays no evidence of fracture no evidence of foreign body per my independent evaluation tetanus updated.  Given he was wearing sneakers we initiated p.o. antibiotics and will discharge with follow-up with podiatry at this time

## 2024-06-03 NOTE — ED PROVIDER NOTE - CLINICAL SUMMARY MEDICAL DECISION MAKING FREE TEXT BOX
Patient is a 69-year-old male presents for evaluation and wound check as he stepped on a right exposed nail with sandals moderate throbbing in nature impacting the heel of the foot    On physical exam patient has minute puncture wound right foot in the calcaneal region bleeding controlled no signs of infection radial pulse 2+ capillary refills on the    We obtained x-rays no evidence of fracture no evidence of foreign body per my independent evaluation tetanus updated.  Given he was wearing sneakers we initiated p.o. antibiotics and will discharge with follow-up with podiatry at this time

## 2024-06-03 NOTE — ED PROVIDER NOTE - PHYSICAL EXAMINATION
--EXAM--  VITAL SIGNS: I have reviewed vs documented at present.  CONSTITUTIONAL: Well-developed; well-nourished; in no acute distress.   SKIN: Punctual small puncture wound on the right foot no active bleeding no erythema no swelling

## 2024-06-03 NOTE — ED PROVIDER NOTE - PATIENT PORTAL LINK FT
You can access the FollowMyHealth Patient Portal offered by [facility] by registering at the following website: [email or web address]. By joining [employer]’s FollowMyHealth portal, you will also be able to view your health information using other applications (apps) compatible with our system.

## 2024-06-03 NOTE — ED PROVIDER NOTE - OBJECTIVE STATEMENT
69-year-old male presents to the ED for evaluation of a wound check.  Patient was wearing sandals and stepped on a nail right foot.  Patient was able to pull the nail out.

## 2024-07-17 ENCOUNTER — APPOINTMENT (OUTPATIENT)
Dept: GASTROENTEROLOGY | Facility: CLINIC | Age: 69
End: 2024-07-17

## 2024-09-04 ENCOUNTER — EMERGENCY (EMERGENCY)
Facility: HOSPITAL | Age: 69
LOS: 0 days | Discharge: HOME | End: 2024-09-04
Attending: STUDENT IN AN ORGANIZED HEALTH CARE EDUCATION/TRAINING PROGRAM
Payer: MEDICARE

## 2024-09-04 VITALS
OXYGEN SATURATION: 97 % | HEART RATE: 874 BPM | WEIGHT: 216.93 LBS | RESPIRATION RATE: 18 BRPM | TEMPERATURE: 98 F | HEIGHT: 69 IN | DIASTOLIC BLOOD PRESSURE: 75 MMHG | SYSTOLIC BLOOD PRESSURE: 136 MMHG

## 2024-09-04 DIAGNOSIS — M25.562 PAIN IN LEFT KNEE: ICD-10-CM

## 2024-09-04 DIAGNOSIS — S81.811A LACERATION WITHOUT FOREIGN BODY, RIGHT LOWER LEG, INITIAL ENCOUNTER: ICD-10-CM
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
## 2024-09-04 DIAGNOSIS — Z98.890 OTHER SPECIFIED POSTPROCEDURAL STATES: Chronic | ICD-10-CM

## 2024-09-04 DIAGNOSIS — M79.661 PAIN IN RIGHT LOWER LEG: ICD-10-CM

## 2024-09-04 DIAGNOSIS — S80.212A ABRASION, LEFT KNEE, INITIAL ENCOUNTER: ICD-10-CM

## 2024-09-04 DIAGNOSIS — I10 ESSENTIAL (PRIMARY) HYPERTENSION: ICD-10-CM

## 2024-09-04 DIAGNOSIS — G89.29 OTHER CHRONIC PAIN: ICD-10-CM

## 2024-09-04 DIAGNOSIS — T63.441A TOXIC EFFECT OF VENOM OF BEES, ACCIDENTAL (UNINTENTIONAL), INITIAL ENCOUNTER: ICD-10-CM
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
## 2024-09-04 PROCEDURE — 73590 X-RAY EXAM OF LOWER LEG: CPT | Mod: RT

## 2024-09-04 PROCEDURE — 99284 EMERGENCY DEPT VISIT MOD MDM: CPT | Mod: 25

## 2024-09-04 PROCEDURE — 73590 X-RAY EXAM OF LOWER LEG: CPT | Mod: 26,RT

## 2024-09-04 PROCEDURE — 73564 X-RAY EXAM KNEE 4 OR MORE: CPT | Mod: LT

## 2024-09-04 PROCEDURE — 12002 RPR S/N/AX/GEN/TRNK2.6-7.5CM: CPT

## 2024-09-04 PROCEDURE — 73564 X-RAY EXAM KNEE 4 OR MORE: CPT | Mod: 26,LT

## 2024-09-04 RX ORDER — CEPHALEXIN 500 MG
500 CAPSULE ORAL ONCE
Refills: 0 | Status: COMPLETED | OUTPATIENT
Start: 2024-09-04 | End: 2024-09-04

## 2024-09-04 RX ORDER — DIPHENHYDRAMINE HCL 50 MG
50 CAPSULE ORAL ONCE
Refills: 0 | Status: COMPLETED | OUTPATIENT
Start: 2024-09-04 | End: 2024-09-04

## 2024-09-04 RX ORDER — FAMOTIDINE 10 MG/ML
20 INJECTION INTRAVENOUS ONCE
Refills: 0 | Status: COMPLETED | OUTPATIENT
Start: 2024-09-04 | End: 2024-09-04

## 2024-09-04 RX ORDER — DEXAMETHASONE 0.75 MG
10 TABLET ORAL ONCE
Refills: 0 | Status: COMPLETED | OUTPATIENT
Start: 2024-09-04 | End: 2024-09-04

## 2024-09-04 RX ORDER — CEPHALEXIN 500 MG
1 CAPSULE ORAL
Qty: 28 | Refills: 0
Start: 2024-09-04 | End: 2024-09-10

## 2024-09-04 RX ADMIN — Medication 50 MILLIGRAM(S): at 16:54

## 2024-09-04 RX ADMIN — FAMOTIDINE 20 MILLIGRAM(S): 10 INJECTION INTRAVENOUS at 16:53

## 2024-09-04 RX ADMIN — Medication 500 MILLIGRAM(S): at 16:53

## 2024-09-04 RX ADMIN — Medication 10 MILLIGRAM(S): at 16:53

## 2024-09-04 NOTE — ED PROCEDURE NOTE - CPROC ED TIME OUT STATEMENT1
“Patient's name, , procedure and correct site were confirmed during the [place] Timeout.”
“Patient's name, , procedure and correct site were confirmed during the [place] Timeout.”

## 2024-09-04 NOTE — ED PROVIDER NOTE - NSFOLLOWUPINSTRUCTIONS_ED_ALL_ED_FT
Return for suture removal in 10-14 days.    Sutured Wound Care  Sutures are stitches that can be used to close wounds. Sutures come in different materials. They may break down as your wound heals (absorbable), or they may need to be removed (nonabsorbable). Taking care of your wound properly can help to prevent pain and infection. It can also help your wound heal more quickly. Follow instructions from your health care provider about how to care for your sutured wound.    Supplies needed:  Soap and water.  A clean, dry towel.  Wound cleanser or saline, if needed.  A clean gauze or bandage (dressing), if needed.  Antibiotic ointment, if told by your health care provider.  How to care for your sutured wound  Two stitched wounds. One is normal. The other is red with pus and infected.  Keep the wound completely dry for the first 24 hours, or for as long as told by your health care provider. After 24–48 hours, you may shower or bathe as told by your health care provider. Do not soak or submerge the wound in water until the sutures have been removed.  After the first 24 hours, clean the wound once a day, or as often as told by your health care provider. Use the following steps:  Wash and rinse the wound as told by your health care provider.  Pat the wound dry with a clean towel. Do not rub the wound.  After cleaning the wound, apply a thin layer of antibiotic ointment as told by your health care provider. This will prevent infection and keep the dressing from sticking to the wound.  Follow instructions from your health care provider about how to change your dressing. Make sure you:  Wash your hands with soap and water for at least 20 seconds before and after you change your dressing. If soap and water are not available, use hand .  Change your dressing at least once a day, or as often as told by your health care provider. If your dressing gets wet or dirty, change it.  Leave sutures and other skin closures, such as adhesive strips or skin glue, in place. These skin closures may need to stay in place for 2 weeks or longer. If adhesive strip edges start to loosen and curl up, you may trim the loose edges. Do not remove adhesive strips completely unless your health care provider tells you to do that.  Check your wound every day for signs of infection. Watch for:  Redness, swelling, or pain.  Fluid or blood.  New warmth, a rash, or hardness at the wound site.  Pus or a bad smell.  Have the sutures removed as told by your health care provider.  Follow these instructions at home:  Medicines    Take or apply over-the-counter and prescription medicines only as told by your health care provider.  If you were prescribed an antibiotic medicine or ointment, take or apply it as told by your health care provider. Do not stop using the antibiotic even if your condition improves.  General instructions    To help reduce scarring after your wound heals, cover your wound with clothing or apply sunscreen of at least 30 SPF whenever you are outside.  Do not scratch or pick at your wound.  Avoid stretching your wound.  Raise (elevate) the injured area above the level of your heart while you are sitting or lying down, if possible.  Eat a diet that includes protein, vitamin A, and vitamin C to help the wound heal.  Drink enough fluid to keep your urine pale yellow.  Keep all follow-up visits. This is important.  Contact a health care provider if:  You received a tetanus shot and you have swelling, severe pain, redness, or bleeding at the injection site.  Your wound breaks open or you notice something coming out if it, such as wood or glass.  You have any of these signs of infection:  Redness, swelling, or pain around your wound.  Fluid or blood coming from your wound.  New warmth, a rash, or hardness around the wound.  A fever.  The skin near your wound changes color.  You have pain that does not get better with medicine.  You develop numbness around the wound.  Get help right away if:  You develop severe swelling or more pain around your wound.  You have pus or a bad smell coming from your wound.  You develop painful lumps near your wound or anywhere on your body.  You have a red streak spreading out from your wound.  The wound is on your hand or foot and:  Your fingers or toes look pale or bluish.  You cannot properly move a finger or toe.  You have numbness that is spreading down your hand, foot, fingers, or toes.  Summary  Sutures are stitches that can be used to close wounds.  Taking care of your wound properly can help to prevent pain and infection.  Keep the wound completely dry for the first 24 hours, or for as long as told by your health care provider. After 24–48 hours, you may shower or bathe as told by your health care provider.  To help with healing, eat foods that are rich in protein, vitamin A, and vitamin C.  This information is not intended to replace advice given to you by your health care provider. Make sure you discuss any questions you have with your health care provider. Return for suture removal in 10-14 days.    Medications were sent to your pharmacy - take as prescribed.      Sutured Wound Care  Sutures are stitches that can be used to close wounds. Sutures come in different materials. They may break down as your wound heals (absorbable), or they may need to be removed (nonabsorbable). Taking care of your wound properly can help to prevent pain and infection. It can also help your wound heal more quickly. Follow instructions from your health care provider about how to care for your sutured wound.    Supplies needed:  Soap and water.  A clean, dry towel.  Wound cleanser or saline, if needed.  A clean gauze or bandage (dressing), if needed.  Antibiotic ointment, if told by your health care provider.  How to care for your sutured wound  Two stitched wounds. One is normal. The other is red with pus and infected.  Keep the wound completely dry for the first 24 hours, or for as long as told by your health care provider. After 24–48 hours, you may shower or bathe as told by your health care provider. Do not soak or submerge the wound in water until the sutures have been removed.  After the first 24 hours, clean the wound once a day, or as often as told by your health care provider. Use the following steps:  Wash and rinse the wound as told by your health care provider.  Pat the wound dry with a clean towel. Do not rub the wound.  After cleaning the wound, apply a thin layer of antibiotic ointment as told by your health care provider. This will prevent infection and keep the dressing from sticking to the wound.  Follow instructions from your health care provider about how to change your dressing. Make sure you:  Wash your hands with soap and water for at least 20 seconds before and after you change your dressing. If soap and water are not available, use hand .  Change your dressing at least once a day, or as often as told by your health care provider. If your dressing gets wet or dirty, change it.  Leave sutures and other skin closures, such as adhesive strips or skin glue, in place. These skin closures may need to stay in place for 2 weeks or longer. If adhesive strip edges start to loosen and curl up, you may trim the loose edges. Do not remove adhesive strips completely unless your health care provider tells you to do that.  Check your wound every day for signs of infection. Watch for:  Redness, swelling, or pain.  Fluid or blood.  New warmth, a rash, or hardness at the wound site.  Pus or a bad smell.  Have the sutures removed as told by your health care provider.  Follow these instructions at home:  Medicines    Take or apply over-the-counter and prescription medicines only as told by your health care provider.  If you were prescribed an antibiotic medicine or ointment, take or apply it as told by your health care provider. Do not stop using the antibiotic even if your condition improves.  General instructions    To help reduce scarring after your wound heals, cover your wound with clothing or apply sunscreen of at least 30 SPF whenever you are outside.  Do not scratch or pick at your wound.  Avoid stretching your wound.  Raise (elevate) the injured area above the level of your heart while you are sitting or lying down, if possible.  Eat a diet that includes protein, vitamin A, and vitamin C to help the wound heal.  Drink enough fluid to keep your urine pale yellow.  Keep all follow-up visits. This is important.  Contact a health care provider if:  You received a tetanus shot and you have swelling, severe pain, redness, or bleeding at the injection site.  Your wound breaks open or you notice something coming out if it, such as wood or glass.  You have any of these signs of infection:  Redness, swelling, or pain around your wound.  Fluid or blood coming from your wound.  New warmth, a rash, or hardness around the wound.  A fever.  The skin near your wound changes color.  You have pain that does not get better with medicine.  You develop numbness around the wound.  Get help right away if:  You develop severe swelling or more pain around your wound.  You have pus or a bad smell coming from your wound.  You develop painful lumps near your wound or anywhere on your body.  You have a red streak spreading out from your wound.  The wound is on your hand or foot and:  Your fingers or toes look pale or bluish.  You cannot properly move a finger or toe.  You have numbness that is spreading down your hand, foot, fingers, or toes.  Summary  Sutures are stitches that can be used to close wounds.  Taking care of your wound properly can help to prevent pain and infection.  Keep the wound completely dry for the first 24 hours, or for as long as told by your health care provider. After 24–48 hours, you may shower or bathe as told by your health care provider.  To help with healing, eat foods that are rich in protein, vitamin A, and vitamin C.  This information is not intended to replace advice given to you by your health care provider. Make sure you discuss any questions you have with your health care provider.

## 2024-09-04 NOTE — ED PROVIDER NOTE - CHIEF COMPLAINT
Pt had some discomfort with colonoscopy initially but then did sleep for a bit . Awake now. VSS, however BP a bit high but did come down with more sedation.    The patient is a 69y Male complaining of lacerations.

## 2024-09-04 NOTE — ED PROVIDER NOTE - CARE PROVIDER_API CALL
Mj Galeana  Internal Medicine  [address] [place] [id number]  Phone: [phone]  Fax: [phone]  Follow Up Time: 1-3 Days

## 2024-09-04 NOTE — ED ADULT TRIAGE NOTE - CHIEF COMPLAINT QUOTE
Patient states "I was stung by a bee, I ran away and tripped over a piece of wood. I cut my right leg".

## 2024-09-04 NOTE — ED PROVIDER NOTE - OBJECTIVE STATEMENT
69-year-old male with past medical history HTN presents with complaints of fall and right leg pain.  States he was stung by a bee while out in his yard and sustained stings to right upper arm, left thigh, right abdomen.  States he is up-to-date with tetanus.  Patient states after stings he ran away and fell and tripped over a log, sustaining lacerations to right lower leg.  Also endorses chronic left knee pain that is worse after sustaining an abrasion to the left knee after falling.  Denies head trauma, LOC, gait disturbance, other trauma, focal numbness/weakness, lightheadedness, dizziness.

## 2024-09-04 NOTE — ED PROVIDER NOTE - CLINICAL SUMMARY MEDICAL DECISION MAKING FREE TEXT BOX
69-year-old male, past medical history of hypertension, presenting after mechanical trip and fall.  He states that he got stung by a bee on his right elbow, right abdomen, left knee, and when he started running he tripped on a piece of wood and he scratched the right shin on the wood as well as the left knee.  Denies head trauma, AC use, dizziness, chest pain, shortness of breath, nausea, vomiting, abdominal pain, numbness, inability to ambulate.  Well-appearing on exam.  In no acute distress.  Mild erythema and localized swelling right medial elbow nontender to palpation.  Full range of motion at right elbow.  Abdomen soft nondistended nontender.  No wounds on abdomen.  Left medial knee with superficial abrasion.  Limited range of motion due to pain of the left knee which has been bothering him recently prior to this incident.  Right anterior shin with curved horizontal laceration 3 cm well-approximated no active bleeding.  Ambulates independently without assistance.  Tdap within the last year confirmed by patient.  Imaging ordered and reviewed.  Laceration thoroughly irrigated and repaired.  Discussed all results with patient.  Given return precautions and follow-up outpatient.  Patient comfortable with plan. 69-year-old male, past medical history of hypertension, presenting after mechanical trip and fall.  He states that he got stung by a bee on his right elbow, right abdomen, left knee, and when he started running he tripped on a piece of wood and he scratched the right shin on the wood as well as the left knee.  Denies head trauma, AC use, dizziness, chest pain, shortness of breath, nausea, vomiting, abdominal pain, numbness, inability to ambulate.  Well-appearing on exam.  In no acute distress.  Mild erythema and localized swelling right medial elbow nontender to palpation.  Full range of motion at right elbow.  Abdomen soft nondistended nontender.  No wounds on abdomen.  Left medial knee with superficial abrasion.  Limited range of motion due to pain of the left knee which has been bothering him recently prior to this incident.  Right anterior shin with curved horizontal laceration 3 cm well-approximated no active bleeding.  Ambulates independently without assistance.  Tdap within the last year confirmed by patient.  Imaging ordered and reviewed.  No acute fracture or dislocation.  Laceration thoroughly irrigated and repaired.  Discussed all results with patient.  Given return precautions and follow-up outpatient.  Patient comfortable with plan.

## 2024-09-04 NOTE — ED PROVIDER NOTE - PATIENT PORTAL LINK FT
You can access the FollowMyHealth Patient Portal offered by [facility] by registering at the following website: [email or web address]. By joining [employer]’s FollowMyHealth portal, you will also be able to view your health information using other applications (apps) compatible with our system.

## 2024-09-04 NOTE — ED PROVIDER NOTE - PHYSICAL EXAMINATION
Vital Signs: I have reviewed the initial vital signs.  Constitutional: appears stated age, no acute distress  Eyes: Sclera clear, EOMI.  Cardiovascular: S1 and S2, regular rate, regular rhythm, well-perfused extremities, radial pulses equal and 2+, pedal pulses 2+ and equal  Respiratory: unlabored respiratory effort, clear to auscultation bilaterally no wheezing, rales, or rhonchi  Gastrointestinal:  abdomen soft, non-tender, faint erythema to site of bee sting at right lower abdomen  Musculoskeletal: supple neck, no lower extremity edema, full range of motion 5/5 strength and sensation intact to left knee  Integumentary: warm, dry, no rash, erythema to right distal upper arm and left thigh at site of bee stings, 4cm v-shaped laceration to right lower leg, 0.5cm linear laceration to right lower leg, abrasion to left knee  Neurologic: awake, alert, oriented x3, extremities’ motor and sensory functions grossly intact

## 2024-09-14 ENCOUNTER — EMERGENCY (EMERGENCY)
Facility: HOSPITAL | Age: 69
LOS: 0 days | Discharge: HOME | End: 2024-09-14
Attending: EMERGENCY MEDICINE
Payer: MEDICARE

## 2024-09-14 VITALS
DIASTOLIC BLOOD PRESSURE: 85 MMHG | SYSTOLIC BLOOD PRESSURE: 138 MMHG | RESPIRATION RATE: 18 BRPM | TEMPERATURE: 98 F | HEART RATE: 83 BPM | OXYGEN SATURATION: 99 % | HEIGHT: 69 IN

## 2024-09-14 DIAGNOSIS — S81.811D LACERATION WITHOUT FOREIGN BODY, RIGHT LOWER LEG, SUBSEQUENT ENCOUNTER: ICD-10-CM

## 2024-09-14 DIAGNOSIS — Z98.890 OTHER SPECIFIED POSTPROCEDURAL STATES: Chronic | ICD-10-CM

## 2024-09-14 DIAGNOSIS — Z48.02 ENCOUNTER FOR REMOVAL OF SUTURES: ICD-10-CM

## 2024-09-14 PROCEDURE — 99212 OFFICE O/P EST SF 10 MIN: CPT

## 2024-09-14 PROCEDURE — L9995: CPT

## 2024-11-13 ENCOUNTER — APPOINTMENT (OUTPATIENT)
Dept: ORTHOPEDIC SURGERY | Facility: CLINIC | Age: 69
End: 2024-11-13
Payer: MEDICARE

## 2024-11-13 DIAGNOSIS — M54.50 LOW BACK PAIN, UNSPECIFIED: ICD-10-CM

## 2024-11-13 DIAGNOSIS — M24.151 OTHER ARTICULAR CARTILAGE DISORDERS, RIGHT HIP: ICD-10-CM

## 2024-11-13 PROCEDURE — 73502 X-RAY EXAM HIP UNI 2-3 VIEWS: CPT

## 2024-11-13 PROCEDURE — 99213 OFFICE O/P EST LOW 20 MIN: CPT

## 2024-11-13 PROCEDURE — 72100 X-RAY EXAM L-S SPINE 2/3 VWS: CPT

## 2024-11-13 RX ORDER — MELOXICAM 15 MG/1
15 TABLET ORAL DAILY
Qty: 30 | Refills: 1 | Status: DISCONTINUED | COMMUNITY
Start: 2024-11-13 | End: 2024-11-13

## 2024-11-18 ENCOUNTER — APPOINTMENT (OUTPATIENT)
Dept: ORTHOPEDIC SURGERY | Facility: CLINIC | Age: 69
End: 2024-11-18

## 2024-11-18 DIAGNOSIS — G56.02 CARPAL TUNNEL SYNDROME, LEFT UPPER LIMB: ICD-10-CM

## 2024-11-18 PROCEDURE — 99204 OFFICE O/P NEW MOD 45 MIN: CPT

## 2024-11-25 ENCOUNTER — NON-APPOINTMENT (OUTPATIENT)
Age: 69
End: 2024-11-25

## 2024-12-06 ENCOUNTER — APPOINTMENT (OUTPATIENT)
Dept: PAIN MANAGEMENT | Facility: CLINIC | Age: 69
End: 2024-12-06
Payer: MEDICARE

## 2024-12-06 DIAGNOSIS — M24.151 OTHER ARTICULAR CARTILAGE DISORDERS, RIGHT HIP: ICD-10-CM

## 2024-12-06 PROCEDURE — 99204 OFFICE O/P NEW MOD 45 MIN: CPT

## 2024-12-10 ENCOUNTER — APPOINTMENT (OUTPATIENT)
Dept: PAIN MANAGEMENT | Facility: CLINIC | Age: 69
End: 2024-12-10
Payer: MEDICARE

## 2024-12-10 DIAGNOSIS — M16.11 UNILATERAL PRIMARY OSTEOARTHRITIS, RIGHT HIP: ICD-10-CM

## 2024-12-10 PROCEDURE — 20610 DRAIN/INJ JOINT/BURSA W/O US: CPT | Mod: RT

## 2024-12-10 PROCEDURE — 77002 NEEDLE LOCALIZATION BY XRAY: CPT

## 2024-12-11 PROBLEM — M16.11 PRIMARY OSTEOARTHRITIS OF RIGHT HIP: Status: ACTIVE | Noted: 2024-12-09

## 2024-12-17 NOTE — ASU PATIENT PROFILE, ADULT - NSTRANFUSIONOBJECTION_GEN_ALL_CORE_SIUH
Trend  Cardiac echo results pending    9/11/23  -Flat  -No CP symptoms  -Suspect elevation due to demand ischemia from fluid overload  -Echo showed EF of 40-45%, mild-mod AI  -Continue Coreg, Entresto, ASA  -Can consider ischemic workup IP vs OP once compensated    9/12/23  -Ok to d/c heparin gtt  -Continue Coreg, Entresto, ASA  -Probable MPI stress test tmw    9/13/23  -CP free  -Continue ASA, Coreg, Entresto  -MPI stress test pending   Patient has no objection to blood transfusions.

## 2024-12-18 ENCOUNTER — APPOINTMENT (OUTPATIENT)
Dept: ORTHOPEDIC SURGERY | Facility: AMBULATORY SURGERY CENTER | Age: 69
End: 2024-12-18

## 2024-12-18 ENCOUNTER — TRANSCRIPTION ENCOUNTER (OUTPATIENT)
Age: 69
End: 2024-12-18

## 2024-12-18 ENCOUNTER — OUTPATIENT (OUTPATIENT)
Dept: OUTPATIENT SERVICES | Facility: HOSPITAL | Age: 69
LOS: 1 days | Discharge: HOME | End: 2024-12-18
Payer: MEDICARE

## 2024-12-18 VITALS
RESPIRATION RATE: 18 BRPM | SYSTOLIC BLOOD PRESSURE: 131 MMHG | HEIGHT: 69 IN | OXYGEN SATURATION: 100 % | HEART RATE: 71 BPM | TEMPERATURE: 98 F | WEIGHT: 216.05 LBS | DIASTOLIC BLOOD PRESSURE: 77 MMHG

## 2024-12-18 VITALS
HEART RATE: 70 BPM | OXYGEN SATURATION: 97 % | DIASTOLIC BLOOD PRESSURE: 84 MMHG | RESPIRATION RATE: 17 BRPM | TEMPERATURE: 98 F | SYSTOLIC BLOOD PRESSURE: 150 MMHG

## 2024-12-18 DIAGNOSIS — G56.02 CARPAL TUNNEL SYNDROME, LEFT UPPER LIMB: ICD-10-CM

## 2024-12-18 DIAGNOSIS — Z98.890 OTHER SPECIFIED POSTPROCEDURAL STATES: Chronic | ICD-10-CM

## 2024-12-18 PROCEDURE — 64721 CARPAL TUNNEL SURGERY: CPT | Mod: LT

## 2024-12-18 RX ORDER — IBUPROFEN 200 MG
1 TABLET ORAL
Qty: 20 | Refills: 0
Start: 2024-12-18

## 2024-12-18 NOTE — ASU DISCHARGE PLAN (ADULT/PEDIATRIC) - FINANCIAL ASSISTANCE
[facility] provides services at a reduced cost to those who are determined to be eligible through [facility]’s financial assistance program. Information regarding [facility]’s financial assistance program can be found by going to [email or web address] or by calling [phone].

## 2024-12-18 NOTE — ASU DISCHARGE PLAN (ADULT/PEDIATRIC) - NS MD DC FALL RISK RISK
For information on Fall & Injury Prevention, visit: [email or web address] OR  [email or web address] OR  https://www.cdc.gov/steadi/patient.html

## 2024-12-20 DIAGNOSIS — G56.02 CARPAL TUNNEL SYNDROME, LEFT UPPER LIMB: ICD-10-CM

## 2024-12-20 DIAGNOSIS — I10 ESSENTIAL (PRIMARY) HYPERTENSION: ICD-10-CM

## 2024-12-24 PROBLEM — F10.90 ALCOHOL USE: Status: ACTIVE | Noted: 2017-02-13

## 2024-12-26 ENCOUNTER — APPOINTMENT (OUTPATIENT)
Dept: PAIN MANAGEMENT | Facility: CLINIC | Age: 69
End: 2024-12-26
Payer: MEDICARE

## 2024-12-26 DIAGNOSIS — M16.11 UNILATERAL PRIMARY OSTEOARTHRITIS, RIGHT HIP: ICD-10-CM

## 2024-12-26 PROCEDURE — 99212 OFFICE O/P EST SF 10 MIN: CPT

## 2024-12-27 ENCOUNTER — APPOINTMENT (OUTPATIENT)
Facility: CLINIC | Age: 69
End: 2024-12-27

## 2024-12-30 ENCOUNTER — APPOINTMENT (OUTPATIENT)
Dept: ORTHOPEDIC SURGERY | Facility: CLINIC | Age: 69
End: 2024-12-30

## 2024-12-30 ENCOUNTER — APPOINTMENT (OUTPATIENT)
Dept: ORTHOPEDIC SURGERY | Facility: CLINIC | Age: 69
End: 2024-12-30
Payer: MEDICARE

## 2024-12-30 DIAGNOSIS — G56.02 CARPAL TUNNEL SYNDROME, LEFT UPPER LIMB: ICD-10-CM

## 2024-12-30 PROCEDURE — 99024 POSTOP FOLLOW-UP VISIT: CPT

## 2025-02-06 NOTE — ED ADULT NURSE NOTE - DOES PATIENT HAVE ADVANCE DIRECTIVE
BP Readings from Last 1 Encounters:   02/05/25 (!) 168/81     Patient does not have a home BP cuff. Patient sees Dr. Mera 4/14/25.    No

## 2025-02-17 ENCOUNTER — APPOINTMENT (OUTPATIENT)
Dept: ORTHOPEDIC SURGERY | Facility: CLINIC | Age: 70
End: 2025-02-17
Payer: MEDICARE

## 2025-02-17 DIAGNOSIS — G56.02 CARPAL TUNNEL SYNDROME, LEFT UPPER LIMB: ICD-10-CM

## 2025-02-17 PROCEDURE — 99024 POSTOP FOLLOW-UP VISIT: CPT

## 2025-02-20 NOTE — ED PROVIDER NOTE - OBJECTIVE STATEMENT
show 64 year old male no sig past medical history states that he was standing on a 4ft ladder and fell onto his right side hitting his right shoulder and head and neck on the ground. patient states he is having a lot of pain in his right shoulder and trouble moving it. patient denies loss of consciousness. but has pain in his neck and mild headache. patient denies dizziness numbness and weakness.

## 2025-02-28 ENCOUNTER — APPOINTMENT (OUTPATIENT)
Dept: NEPHROLOGY | Facility: CLINIC | Age: 70
End: 2025-02-28

## 2025-03-25 NOTE — ED PROVIDER NOTE - HIV OFFER
[FreeTextEntry3] : I, Tone Razo MD, I personally performed the services described in the documentation, reviewed the documentation recorded by the scribe in my presence and it accurately and completely records my words and actions Previously Declined (within the last year)

## 2025-04-20 NOTE — PRE-OP CHECKLIST - HAND OFF
[Time Spent: ___ minutes] : I have spent [unfilled] minutes of time on the encounter which excludes teaching and separately reported services. Holding RN to OR RN

## 2025-04-29 NOTE — ED PROCEDURE NOTE - CPROC ED LACER REPAIR DETAIL1
----- Message from Codi sent at 4/28/2025  4:03 PM CDT -----  Contact: [phone]  [email or web address] Patient is calling for Medical Advice regarding: Patient is requesting a call back from the staff regarding to drop  a form today  for get the Real ID Patient wants a call back or thru [facility]: call back Comments: Thank you Please advise patient replies from provider may take up to 48 hours.  
The wound was explored to base in bloodless field.
The wound was explored to base in bloodless field.

## 2025-07-24 ENCOUNTER — RESULT REVIEW (OUTPATIENT)
Age: 70
End: 2025-07-24

## 2025-07-24 ENCOUNTER — OUTPATIENT (OUTPATIENT)
Dept: OUTPATIENT SERVICES | Facility: HOSPITAL | Age: 70
LOS: 1 days | End: 2025-07-24
Payer: MEDICARE

## 2025-07-24 DIAGNOSIS — Z98.890 OTHER SPECIFIED POSTPROCEDURAL STATES: Chronic | ICD-10-CM

## 2025-07-24 DIAGNOSIS — Z00.8 ENCOUNTER FOR OTHER GENERAL EXAMINATION: ICD-10-CM

## 2025-07-24 DIAGNOSIS — R06.02 SHORTNESS OF BREATH: ICD-10-CM

## 2025-07-24 PROCEDURE — 75574 CT ANGIO HRT W/3D IMAGE: CPT

## 2025-07-24 PROCEDURE — 75574 CT ANGIO HRT W/3D IMAGE: CPT | Mod: 26

## 2025-07-25 DIAGNOSIS — R06.02 SHORTNESS OF BREATH: ICD-10-CM

## 2025-08-06 ENCOUNTER — OUTPATIENT (OUTPATIENT)
Dept: OUTPATIENT SERVICES | Facility: HOSPITAL | Age: 70
LOS: 1 days | End: 2025-08-06
Payer: MEDICARE

## 2025-08-06 ENCOUNTER — RESULT REVIEW (OUTPATIENT)
Age: 70
End: 2025-08-06

## 2025-08-06 DIAGNOSIS — R93.1 ABNORMAL FINDINGS ON DIAGNOSTIC IMAGING OF HEART AND CORONARY CIRCULATION: ICD-10-CM

## 2025-08-06 DIAGNOSIS — Z98.890 OTHER SPECIFIED POSTPROCEDURAL STATES: Chronic | ICD-10-CM

## 2025-08-06 PROCEDURE — 75580 N-INVAS EST C FFR SW ALY CTA: CPT

## 2025-08-06 PROCEDURE — 75580 N-INVAS EST C FFR SW ALY CTA: CPT | Mod: 26

## 2025-08-07 VITALS
SYSTOLIC BLOOD PRESSURE: 144 MMHG | HEART RATE: 64 BPM | DIASTOLIC BLOOD PRESSURE: 91 MMHG | RESPIRATION RATE: 19 BRPM | OXYGEN SATURATION: 98 %

## 2025-08-07 DIAGNOSIS — R93.1 ABNORMAL FINDINGS ON DIAGNOSTIC IMAGING OF HEART AND CORONARY CIRCULATION: ICD-10-CM

## 2025-08-12 ENCOUNTER — TRANSCRIPTION ENCOUNTER (OUTPATIENT)
Age: 70
End: 2025-08-12

## 2025-08-12 ENCOUNTER — OUTPATIENT (OUTPATIENT)
Dept: OUTPATIENT SERVICES | Facility: HOSPITAL | Age: 70
LOS: 1 days | Discharge: HOME | End: 2025-08-12
Payer: MEDICARE

## 2025-08-12 VITALS
OXYGEN SATURATION: 98 % | DIASTOLIC BLOOD PRESSURE: 85 MMHG | RESPIRATION RATE: 15 BRPM | SYSTOLIC BLOOD PRESSURE: 157 MMHG | HEART RATE: 60 BPM

## 2025-08-12 DIAGNOSIS — I25.10 ATHEROSCLEROTIC HEART DISEASE OF NATIVE CORONARY ARTERY WITHOUT ANGINA PECTORIS: ICD-10-CM

## 2025-08-12 DIAGNOSIS — Z98.890 OTHER SPECIFIED POSTPROCEDURAL STATES: Chronic | ICD-10-CM

## 2025-08-12 LAB
ANION GAP SERPL CALC-SCNC: 10 MMOL/L — SIGNIFICANT CHANGE UP (ref 7–14)
ANION GAP SERPL CALC-SCNC: 12 MMOL/L — SIGNIFICANT CHANGE UP (ref 7–14)
BUN SERPL-MCNC: 38 MG/DL — HIGH (ref 10–20)
BUN SERPL-MCNC: 41 MG/DL — HIGH (ref 10–20)
CALCIUM SERPL-MCNC: 10 MG/DL — SIGNIFICANT CHANGE UP (ref 8.4–10.5)
CALCIUM SERPL-MCNC: 9.1 MG/DL — SIGNIFICANT CHANGE UP (ref 8.4–10.5)
CHLORIDE SERPL-SCNC: 108 MMOL/L — SIGNIFICANT CHANGE UP (ref 98–110)
CHLORIDE SERPL-SCNC: 108 MMOL/L — SIGNIFICANT CHANGE UP (ref 98–110)
CO2 SERPL-SCNC: 21 MMOL/L — SIGNIFICANT CHANGE UP (ref 17–32)
CO2 SERPL-SCNC: 24 MMOL/L — SIGNIFICANT CHANGE UP (ref 17–32)
CREAT SERPL-MCNC: 1.8 MG/DL — HIGH (ref 0.7–1.5)
CREAT SERPL-MCNC: 1.9 MG/DL — HIGH (ref 0.7–1.5)
EGFR: 38 ML/MIN/1.73M2 — LOW
EGFR: 38 ML/MIN/1.73M2 — LOW
EGFR: 40 ML/MIN/1.73M2 — LOW
EGFR: 40 ML/MIN/1.73M2 — LOW
GLUCOSE SERPL-MCNC: 92 MG/DL — SIGNIFICANT CHANGE UP (ref 70–99)
GLUCOSE SERPL-MCNC: 99 MG/DL — SIGNIFICANT CHANGE UP (ref 70–99)
HCT VFR BLD CALC: 38.2 % — LOW (ref 42–52)
HCT VFR BLD CALC: 38.7 % — LOW (ref 42–52)
HGB BLD-MCNC: 13 G/DL — LOW (ref 14–18)
HGB BLD-MCNC: 13.1 G/DL — LOW (ref 14–18)
MCHC RBC-ENTMCNC: 28.1 PG — SIGNIFICANT CHANGE UP (ref 27–31)
MCHC RBC-ENTMCNC: 28.2 PG — SIGNIFICANT CHANGE UP (ref 27–31)
MCHC RBC-ENTMCNC: 33.9 G/DL — SIGNIFICANT CHANGE UP (ref 32–37)
MCHC RBC-ENTMCNC: 34 G/DL — SIGNIFICANT CHANGE UP (ref 32–37)
MCV RBC AUTO: 82.7 FL — SIGNIFICANT CHANGE UP (ref 80–94)
MCV RBC AUTO: 83.4 FL — SIGNIFICANT CHANGE UP (ref 80–94)
NRBC BLD AUTO-RTO: 0 /100 WBCS — SIGNIFICANT CHANGE UP (ref 0–0)
NRBC BLD AUTO-RTO: 0 /100 WBCS — SIGNIFICANT CHANGE UP (ref 0–0)
PLATELET # BLD AUTO: 132 K/UL — SIGNIFICANT CHANGE UP (ref 130–400)
PLATELET # BLD AUTO: 134 K/UL — SIGNIFICANT CHANGE UP (ref 130–400)
PMV BLD: 10.7 FL — HIGH (ref 7.4–10.4)
PMV BLD: 11.4 FL — HIGH (ref 7.4–10.4)
POTASSIUM SERPL-MCNC: 3.6 MMOL/L — SIGNIFICANT CHANGE UP (ref 3.5–5)
POTASSIUM SERPL-MCNC: 3.9 MMOL/L — SIGNIFICANT CHANGE UP (ref 3.5–5)
POTASSIUM SERPL-SCNC: 3.6 MMOL/L — SIGNIFICANT CHANGE UP (ref 3.5–5)
POTASSIUM SERPL-SCNC: 3.9 MMOL/L — SIGNIFICANT CHANGE UP (ref 3.5–5)
RBC # BLD: 4.62 M/UL — LOW (ref 4.7–6.1)
RBC # BLD: 4.64 M/UL — LOW (ref 4.7–6.1)
RBC # FLD: 14.6 % — HIGH (ref 11.5–14.5)
RBC # FLD: 14.6 % — HIGH (ref 11.5–14.5)
SODIUM SERPL-SCNC: 139 MMOL/L — SIGNIFICANT CHANGE UP (ref 135–146)
SODIUM SERPL-SCNC: 144 MMOL/L — SIGNIFICANT CHANGE UP (ref 135–146)
WBC # BLD: 4.45 K/UL — LOW (ref 4.8–10.8)
WBC # BLD: 4.91 K/UL — SIGNIFICANT CHANGE UP (ref 4.8–10.8)
WBC # FLD AUTO: 4.45 K/UL — LOW (ref 4.8–10.8)
WBC # FLD AUTO: 4.91 K/UL — SIGNIFICANT CHANGE UP (ref 4.8–10.8)

## 2025-08-12 PROCEDURE — 93010 ELECTROCARDIOGRAM REPORT: CPT

## 2025-08-12 PROCEDURE — C1725: CPT

## 2025-08-12 PROCEDURE — 36415 COLL VENOUS BLD VENIPUNCTURE: CPT

## 2025-08-12 PROCEDURE — 93458 L HRT ARTERY/VENTRICLE ANGIO: CPT | Mod: 59

## 2025-08-12 PROCEDURE — 85027 COMPLETE CBC AUTOMATED: CPT

## 2025-08-12 PROCEDURE — C1753: CPT

## 2025-08-12 PROCEDURE — 93005 ELECTROCARDIOGRAM TRACING: CPT

## 2025-08-12 PROCEDURE — C1887: CPT

## 2025-08-12 PROCEDURE — C1894: CPT

## 2025-08-12 PROCEDURE — C9600: CPT | Mod: LD

## 2025-08-12 PROCEDURE — 80048 BASIC METABOLIC PNL TOTAL CA: CPT

## 2025-08-12 PROCEDURE — 92978 ENDOLUMINL IVUS OCT C 1ST: CPT | Mod: LD

## 2025-08-12 PROCEDURE — C1769: CPT

## 2025-08-12 PROCEDURE — C1874: CPT

## 2025-08-12 RX ORDER — METOPROLOL SUCCINATE 50 MG/1
12.5 TABLET, EXTENDED RELEASE ORAL ONCE
Refills: 0 | Status: COMPLETED | OUTPATIENT
Start: 2025-08-12 | End: 2025-08-12

## 2025-08-12 RX ORDER — ASPIRIN 325 MG
324 TABLET ORAL ONCE
Refills: 0 | Status: COMPLETED | OUTPATIENT
Start: 2025-08-12 | End: 2025-08-12

## 2025-08-12 RX ORDER — CLOPIDOGREL BISULFATE 75 MG/1
300 TABLET, FILM COATED ORAL ONCE
Refills: 0 | Status: COMPLETED | OUTPATIENT
Start: 2025-08-12 | End: 2025-08-12

## 2025-08-12 RX ORDER — CLOPIDOGREL BISULFATE 75 MG/1
1 TABLET, FILM COATED ORAL
Qty: 90 | Refills: 4
Start: 2025-08-12 | End: 2026-11-04

## 2025-08-12 RX ORDER — AMLODIPINE BESYLATE 10 MG/1
1 TABLET ORAL
Refills: 0 | DISCHARGE

## 2025-08-12 RX ORDER — OLMESARTAN MEDOXOMIL 5 MG/1
1 TABLET, FILM COATED ORAL
Refills: 0 | DISCHARGE

## 2025-08-12 RX ORDER — ASPIRIN 325 MG
1 TABLET ORAL
Qty: 90 | Refills: 4
Start: 2025-08-12 | End: 2026-11-04

## 2025-08-12 RX ORDER — HYDROCHLOROTHIAZIDE 50 MG/1
1 TABLET ORAL
Refills: 0 | DISCHARGE

## 2025-08-12 RX ORDER — ATORVASTATIN CALCIUM 80 MG/1
1 TABLET, FILM COATED ORAL
Refills: 0 | DISCHARGE

## 2025-08-12 RX ADMIN — METOPROLOL SUCCINATE 12.5 MILLIGRAM(S): 50 TABLET, EXTENDED RELEASE ORAL at 08:33

## 2025-08-12 RX ADMIN — CLOPIDOGREL BISULFATE 300 MILLIGRAM(S): 75 TABLET, FILM COATED ORAL at 12:35

## 2025-08-12 RX ADMIN — Medication 150 MILLILITER(S): at 12:35

## 2025-08-12 RX ADMIN — Medication 324 MILLIGRAM(S): at 08:32

## 2025-08-12 RX ADMIN — Medication 100 MILLILITER(S): at 08:31

## 2025-08-12 RX ADMIN — Medication 500 MILLILITER(S): at 08:00

## 2025-08-13 DIAGNOSIS — I25.10 ATHEROSCLEROTIC HEART DISEASE OF NATIVE CORONARY ARTERY WITHOUT ANGINA PECTORIS: ICD-10-CM

## 2025-08-13 DIAGNOSIS — R93.1 ABNORMAL FINDINGS ON DIAGNOSTIC IMAGING OF HEART AND CORONARY CIRCULATION: ICD-10-CM
